# Patient Record
Sex: FEMALE | Race: WHITE | Employment: PART TIME | URBAN - METROPOLITAN AREA
[De-identification: names, ages, dates, MRNs, and addresses within clinical notes are randomized per-mention and may not be internally consistent; named-entity substitution may affect disease eponyms.]

---

## 2017-01-25 RX ORDER — PANTOPRAZOLE SODIUM 40 MG/1
TABLET, DELAYED RELEASE ORAL
Qty: 90 TABLET | Refills: 3 | Status: SHIPPED | OUTPATIENT
Start: 2017-01-25 | End: 2018-03-05 | Stop reason: SDUPTHER

## 2017-01-25 RX ORDER — LISINOPRIL AND HYDROCHLOROTHIAZIDE 20; 12.5 MG/1; MG/1
TABLET ORAL
Qty: 90 TABLET | Refills: 3 | Status: SHIPPED | OUTPATIENT
Start: 2017-01-25 | End: 2018-03-05 | Stop reason: SDUPTHER

## 2017-01-25 RX ORDER — SIMVASTATIN 20 MG
20 TABLET ORAL EVERY EVENING
Qty: 90 TABLET | Refills: 3 | Status: SHIPPED | OUTPATIENT
Start: 2017-01-25 | End: 2018-03-05 | Stop reason: SDUPTHER

## 2017-01-25 RX ORDER — ATENOLOL 25 MG/1
25 TABLET ORAL DAILY
Qty: 90 TABLET | Refills: 3 | Status: ON HOLD | OUTPATIENT
Start: 2017-01-25 | End: 2018-09-20 | Stop reason: DRUGHIGH

## 2017-05-17 ENCOUNTER — OFFICE VISIT (OUTPATIENT)
Dept: PRIMARY CARE CLINIC | Age: 52
End: 2017-05-17

## 2017-05-17 VITALS
SYSTOLIC BLOOD PRESSURE: 126 MMHG | RESPIRATION RATE: 14 BRPM | HEIGHT: 61 IN | BODY MASS INDEX: 23.6 KG/M2 | TEMPERATURE: 98.1 F | DIASTOLIC BLOOD PRESSURE: 76 MMHG | HEART RATE: 72 BPM | WEIGHT: 125 LBS

## 2017-05-17 DIAGNOSIS — R06.2 WHEEZING: ICD-10-CM

## 2017-05-17 DIAGNOSIS — Z72.0 TOBACCO ABUSE: ICD-10-CM

## 2017-05-17 DIAGNOSIS — S22.43XA CLOSED FRACTURE OF MULTIPLE RIBS OF BOTH SIDES, INITIAL ENCOUNTER: ICD-10-CM

## 2017-05-17 DIAGNOSIS — Z12.31 ENCOUNTER FOR SCREENING MAMMOGRAM FOR BREAST CANCER: ICD-10-CM

## 2017-05-17 DIAGNOSIS — J44.9 CHRONIC OBSTRUCTIVE PULMONARY DISEASE, UNSPECIFIED COPD TYPE (HCC): Primary | ICD-10-CM

## 2017-05-17 PROCEDURE — G8420 CALC BMI NORM PARAMETERS: HCPCS | Performed by: FAMILY MEDICINE

## 2017-05-17 PROCEDURE — G8926 SPIRO NO PERF OR DOC: HCPCS | Performed by: FAMILY MEDICINE

## 2017-05-17 PROCEDURE — G8427 DOCREV CUR MEDS BY ELIG CLIN: HCPCS | Performed by: FAMILY MEDICINE

## 2017-05-17 PROCEDURE — 99214 OFFICE O/P EST MOD 30 MIN: CPT | Performed by: FAMILY MEDICINE

## 2017-05-17 PROCEDURE — 3023F SPIROM DOC REV: CPT | Performed by: FAMILY MEDICINE

## 2017-05-17 PROCEDURE — 3017F COLORECTAL CA SCREEN DOC REV: CPT | Performed by: FAMILY MEDICINE

## 2017-05-17 PROCEDURE — 3014F SCREEN MAMMO DOC REV: CPT | Performed by: FAMILY MEDICINE

## 2017-05-17 PROCEDURE — 4004F PT TOBACCO SCREEN RCVD TLK: CPT | Performed by: FAMILY MEDICINE

## 2017-05-17 ASSESSMENT — PATIENT HEALTH QUESTIONNAIRE - PHQ9
SUM OF ALL RESPONSES TO PHQ QUESTIONS 1-9: 0
1. LITTLE INTEREST OR PLEASURE IN DOING THINGS: 0
SUM OF ALL RESPONSES TO PHQ9 QUESTIONS 1 & 2: 0
2. FEELING DOWN, DEPRESSED OR HOPELESS: 0

## 2017-05-17 ASSESSMENT — ENCOUNTER SYMPTOMS
EYES NEGATIVE: 1
WHEEZING: 1
ABDOMINAL PAIN: 0
ALLERGIC/IMMUNOLOGIC NEGATIVE: 1

## 2017-08-17 ENCOUNTER — OFFICE VISIT (OUTPATIENT)
Dept: PULMONOLOGY | Age: 52
End: 2017-08-17

## 2017-08-17 VITALS
HEART RATE: 70 BPM | WEIGHT: 123 LBS | RESPIRATION RATE: 16 BRPM | DIASTOLIC BLOOD PRESSURE: 64 MMHG | SYSTOLIC BLOOD PRESSURE: 118 MMHG | TEMPERATURE: 98 F | BODY MASS INDEX: 23.22 KG/M2 | OXYGEN SATURATION: 96 % | HEIGHT: 61 IN

## 2017-08-17 DIAGNOSIS — G47.33 OBSTRUCTIVE SLEEP APNEA: ICD-10-CM

## 2017-08-17 DIAGNOSIS — R91.1 LUNG NODULE: Primary | ICD-10-CM

## 2017-08-17 DIAGNOSIS — J44.9 CHRONIC OBSTRUCTIVE PULMONARY DISEASE, UNSPECIFIED COPD TYPE (HCC): ICD-10-CM

## 2017-08-17 PROCEDURE — 3023F SPIROM DOC REV: CPT | Performed by: INTERNAL MEDICINE

## 2017-08-17 PROCEDURE — G8926 SPIRO NO PERF OR DOC: HCPCS | Performed by: INTERNAL MEDICINE

## 2017-08-17 PROCEDURE — 3017F COLORECTAL CA SCREEN DOC REV: CPT | Performed by: INTERNAL MEDICINE

## 2017-08-17 PROCEDURE — G8420 CALC BMI NORM PARAMETERS: HCPCS | Performed by: INTERNAL MEDICINE

## 2017-08-17 PROCEDURE — 4004F PT TOBACCO SCREEN RCVD TLK: CPT | Performed by: INTERNAL MEDICINE

## 2017-08-17 PROCEDURE — 99204 OFFICE O/P NEW MOD 45 MIN: CPT | Performed by: INTERNAL MEDICINE

## 2017-08-17 PROCEDURE — G8427 DOCREV CUR MEDS BY ELIG CLIN: HCPCS | Performed by: INTERNAL MEDICINE

## 2017-08-17 PROCEDURE — 3014F SCREEN MAMMO DOC REV: CPT | Performed by: INTERNAL MEDICINE

## 2017-08-17 ASSESSMENT — ENCOUNTER SYMPTOMS
WHEEZING: 1
VOMITING: 0
SINUS PRESSURE: 0
SORE THROAT: 0
COUGH: 1
ABDOMINAL PAIN: 0
NAUSEA: 0
SHORTNESS OF BREATH: 1
RHINORRHEA: 0
CHEST TIGHTNESS: 1
DIARRHEA: 0

## 2017-09-05 ENCOUNTER — HOSPITAL ENCOUNTER (OUTPATIENT)
Dept: PULMONOLOGY | Age: 52
Discharge: HOME | End: 2017-09-05
Payer: COMMERCIAL

## 2017-09-05 DIAGNOSIS — J44.9 CHRONIC OBSTRUCTIVE PULMONARY DISEASE, UNSPECIFIED COPD TYPE (HCC): ICD-10-CM

## 2017-09-05 PROCEDURE — 94726 PLETHYSMOGRAPHY LUNG VOLUMES: CPT | Performed by: INTERNAL MEDICINE

## 2017-09-05 PROCEDURE — 6360000002 HC RX W HCPCS: Performed by: INTERNAL MEDICINE

## 2017-09-05 PROCEDURE — 94729 DIFFUSING CAPACITY: CPT

## 2017-09-05 PROCEDURE — 94729 DIFFUSING CAPACITY: CPT | Performed by: INTERNAL MEDICINE

## 2017-09-05 PROCEDURE — 94060 EVALUATION OF WHEEZING: CPT

## 2017-09-05 PROCEDURE — 94726 PLETHYSMOGRAPHY LUNG VOLUMES: CPT

## 2017-09-05 PROCEDURE — 94060 EVALUATION OF WHEEZING: CPT | Performed by: INTERNAL MEDICINE

## 2017-09-05 RX ORDER — ALBUTEROL SULFATE 2.5 MG/3ML
2.5 SOLUTION RESPIRATORY (INHALATION) ONCE
Status: COMPLETED | OUTPATIENT
Start: 2017-09-05 | End: 2017-09-05

## 2017-09-05 RX ADMIN — ALBUTEROL SULFATE 2.5 MG: 2.5 SOLUTION RESPIRATORY (INHALATION) at 15:41

## 2017-10-04 ENCOUNTER — OFFICE VISIT (OUTPATIENT)
Dept: PULMONOLOGY | Age: 52
End: 2017-10-04

## 2017-10-04 VITALS
TEMPERATURE: 98.1 F | RESPIRATION RATE: 16 BRPM | WEIGHT: 125 LBS | BODY MASS INDEX: 23.6 KG/M2 | DIASTOLIC BLOOD PRESSURE: 80 MMHG | HEART RATE: 65 BPM | SYSTOLIC BLOOD PRESSURE: 126 MMHG | OXYGEN SATURATION: 98 % | HEIGHT: 61 IN

## 2017-10-04 DIAGNOSIS — R91.1 LUNG NODULE: Primary | ICD-10-CM

## 2017-10-04 DIAGNOSIS — J44.9 CHRONIC OBSTRUCTIVE PULMONARY DISEASE, UNSPECIFIED COPD TYPE (HCC): ICD-10-CM

## 2017-10-04 DIAGNOSIS — G47.33 OBSTRUCTIVE SLEEP APNEA: ICD-10-CM

## 2017-10-04 PROCEDURE — G8926 SPIRO NO PERF OR DOC: HCPCS | Performed by: INTERNAL MEDICINE

## 2017-10-04 PROCEDURE — 4004F PT TOBACCO SCREEN RCVD TLK: CPT | Performed by: INTERNAL MEDICINE

## 2017-10-04 PROCEDURE — 99214 OFFICE O/P EST MOD 30 MIN: CPT | Performed by: INTERNAL MEDICINE

## 2017-10-04 PROCEDURE — G8484 FLU IMMUNIZE NO ADMIN: HCPCS | Performed by: INTERNAL MEDICINE

## 2017-10-04 PROCEDURE — G8427 DOCREV CUR MEDS BY ELIG CLIN: HCPCS | Performed by: INTERNAL MEDICINE

## 2017-10-04 PROCEDURE — G8420 CALC BMI NORM PARAMETERS: HCPCS | Performed by: INTERNAL MEDICINE

## 2017-10-04 PROCEDURE — 3023F SPIROM DOC REV: CPT | Performed by: INTERNAL MEDICINE

## 2017-10-04 PROCEDURE — 3017F COLORECTAL CA SCREEN DOC REV: CPT | Performed by: INTERNAL MEDICINE

## 2017-10-04 PROCEDURE — 3014F SCREEN MAMMO DOC REV: CPT | Performed by: INTERNAL MEDICINE

## 2017-10-04 RX ORDER — VARENICLINE TARTRATE 1 MG/1
1 TABLET, FILM COATED ORAL 2 TIMES DAILY
Qty: 60 TABLET | Refills: 3 | Status: SHIPPED | OUTPATIENT
Start: 2017-10-04 | End: 2018-02-08

## 2017-10-04 RX ORDER — VARENICLINE TARTRATE 25 MG
KIT ORAL
Qty: 1 EACH | Refills: 0 | Status: SHIPPED | OUTPATIENT
Start: 2017-10-04 | End: 2017-10-04 | Stop reason: SDUPTHER

## 2017-10-04 RX ORDER — VARENICLINE TARTRATE 1 MG/1
1 TABLET, FILM COATED ORAL 2 TIMES DAILY
Qty: 60 TABLET | Refills: 3 | Status: SHIPPED | OUTPATIENT
Start: 2017-10-04 | End: 2017-10-04 | Stop reason: SDUPTHER

## 2017-10-04 RX ORDER — VARENICLINE TARTRATE 25 MG
KIT ORAL
Qty: 1 EACH | Refills: 0 | Status: SHIPPED | OUTPATIENT
Start: 2017-10-04 | End: 2018-02-08

## 2017-10-04 ASSESSMENT — ENCOUNTER SYMPTOMS
COUGH: 1
ABDOMINAL PAIN: 0
RHINORRHEA: 0
SINUS PRESSURE: 0
VOMITING: 0
SHORTNESS OF BREATH: 1
WHEEZING: 1
SORE THROAT: 0
CHEST TIGHTNESS: 1
NAUSEA: 0
DIARRHEA: 0

## 2017-10-04 NOTE — PROGRESS NOTES
Subjective:     Sneha Colin is a 46 y.o. female who complains today of:     Chief Complaint   Patient presents with    COPD    Sleep Apnea    Other     lung nodule       HPI  Patient is here for a follow-up visit regarding COPD and lung nodules as well as suspected sleep apnea. Since initial visit patient had complete pulmonary functions done which showed mild to moderate obstructive disease somewhat worse than her spirometry done 3 years ago. She also had nocturnal oximetry testing which showed significant desaturations periodically they appeared to be cyclical consistent with obstructive sleep apnea. Other times she had persistent desaturations consistent with alveolar hypoventilation due to COPD. Patient reports continued cough sputum production wheezing and shortness breath on exertion. She understands the effect of smoking and the proven worsening of her obstructive lung disease and is interested in trying Chantix to help her quit smoking now. Allergies:  Latex; Demerol; and Pcn [penicillins]  Past Medical History:   Diagnosis Date    Abdominal pain, right upper quadrant, neg US, [place] Rachel 10/27/2014    Chronic obstructive pulmonary disease ([address] 4/5/2016    Diabetes mellitus ([address]     Dyslipidemia 7/25/2013    Esophageal stricture 7/25/2013    Fatty liver 10/27/2014    GERD (gastroesophageal reflux disease)     Gestational diabetes mellitus     Hiatal hernia 12/8/2014    HNP (herniated nucleus pulposus) 04/2006    C5-6 C6-7  (L)    Hyperglycemia 5/1/2013    Hyperlipidemia     IBS (irritable bowel syndrome) 10/27/2014    OA (osteoarthritis), R shoulder 10/28/2014    Oxygen dependent, and smoking 9/12/2016    Systemic lupus ([facility] [address]     BY GYN    Thrush 10/10/2016    Tobacco abuse     Wheezing 4/8/2015     Past Surgical History:   Procedure Laterality Date    APPENDECTOMY  79,80    BLADDER SURGERY  2000    FISCH6 X2    CARDIAC CATHETERIZATION  3/18/2014    DR. Lebron Adjutant   SECTION  90,11,14     X3    COLONOSCOPY  2015    DILATION AND CURETTAGE OF UTERUS      MANY D&C 'S    HERNIA REPAIR      HYSTERECTOMY  2000    UPPER GASTROINTESTINAL ENDOSCOPY  13    DR. MCKENZIE     Family History   Problem Relation Age of Onset    Cancer Mother      PANCREAS    High Blood Pressure Mother     Cancer Father      Skip Mcleod     Social History     Social History    Marital status:      Spouse name: N/A    Number of children: N/A    Years of education: N/A     Occupational History    Not on file. Social History Main Topics    Smoking status: Current Every Day Smoker     Packs/day: 0.70     Years: 30.00     Types: Cigarettes    Smokeless tobacco: Never Used    Alcohol use Yes      Comment: occ    Drug use: No    Sexual activity: Not on file     Other Topics Concern    Not on file     Social History Narrative         Review of Systems   Constitutional: Negative for appetite change, chills, diaphoresis, fatigue and fever. HENT: Negative for congestion, nosebleeds, postnasal drip, rhinorrhea, sinus pressure, sneezing and sore throat. Eyes: Negative for visual disturbance. Respiratory: Positive for cough, chest tightness, shortness of breath and wheezing. Cardiovascular: Positive for palpitations. Negative for chest pain and leg swelling. Gastrointestinal: Negative for abdominal pain, diarrhea, nausea and vomiting. Genitourinary: Negative for dysuria and urgency. Musculoskeletal: Negative for arthralgias, joint swelling and myalgias. Skin: Negative for rash. Allergic/Immunologic: Negative for environmental allergies. Neurological: Negative for tremors, weakness, light-headedness and headaches. Psychiatric/Behavioral: Negative for behavioral problems and sleep disturbance.          Objective:     Vitals:    10/04/17 1049   BP: 126/80   Site: Right Arm   Position: Sitting   Cuff Size: Medium Adult   Pulse: 65   Resp: 16   Temp: 98.1 °F (36.7 °C)   TempSrc: Tympanic   SpO2: 98%   Weight: 125 lb (56.7 kg)   Height: 5' 1\" (1.549 m)         Physical Exam   Constitutional: She is oriented to person, place, and time. She appears well-developed and well-nourished. HENT:   Head: Normocephalic and atraumatic. Mouth/Throat: Oropharynx is clear and moist.   Eyes: EOM are normal. Pupils are equal, round, and reactive to light. Neck: No JVD present. No tracheal deviation present. No thyromegaly present. Cardiovascular: Normal rate and regular rhythm. Exam reveals no gallop. No murmur heard. Pulmonary/Chest: She has no wheezes. She has no rales. She exhibits no tenderness. Abdominal: She exhibits no distension. Musculoskeletal: Normal range of motion. She exhibits no edema. Neurological: She is alert and oriented to person, place, and time. Coordination normal.   Skin: Skin is warm and dry. No rash noted. Psychiatric: She has a normal mood and affect. Assessment:     1. Lung nodule  XR CHEST STANDARD (2 VW)   2. Chronic obstructive pulmonary disease, unspecified COPD type ([address]     3. Obstructive sleep apnea       Patient states that she is not able to afford initiating oxygen therapy she had already discussed that with the home care company and her deductibles will be too high to start now she was encouraged to work aggressively on quitting smoking I will prescribe Chantix for her and we'll obtain a follow-up chest x-ray before next visit then I'll see her for follow-up in 3 months      Plan:     Orders Placed This Encounter   Procedures    XR CHEST STANDARD (2 VW)     Standing Status:   Future     Standing Expiration Date:   1/19/2018     Order Specific Question:   Reason for exam:     Answer:   Shortness of breath and calcified lung nodules     Orders Placed This Encounter   Medications    varenicline (CHANTIX STARTING MONTH PAK) 0.5 MG X 11 & 1 MG X 42 tablet     Sig: Take by mouth.      Dispense:  1 each     Refill:  0    varenicline (CHANTIX CONTINUING MONTH BEST) 1 MG tablet     Sig: Take 1 tablet by mouth 2 times daily     Dispense:  60 tablet     Refill:  3           Return in about 3 months (around 1/4/2018) for re-evaluation.       Severa Cam, MD

## 2017-10-04 NOTE — MR AVS SNAPSHOT
Urine Check For Kidney Problems 3/30/1983    Tetanus Combination Vaccine (1 - Tdap) 3/30/1984    Pneumococcal Vaccine - Pneumovax for adults aged 19-64 years with: chronic heart disease, chronic lung disease, diabetes mellitus, alcoholism, chronic liver disease, or cigarette smoking. (1 of 1 - PPSV23) 3/30/1984    Hemoglobin A1C (Test For Long-Term Glucose Control) 5/1/2014    Eye Exam By An Eye Doctor 4/7/2015    Mammograms are recommended every 2 years for low/average risk patients aged 48 - 69, and every year for high risk patients per updated national guidelines. However these guidelines can be individualized by your provider. 12/29/2016    Cholesterol Screening 3/24/2017    Yearly Flu Vaccine (1) 9/1/2017    Pap Smear 1/29/2018    Colonoscopy 4/1/2025            [employer] Signup           Our records indicate that you have an active [employer] account. You can view your After Visit Summary by going to [email or web address] and logging in with your [employer]IO username and password. If you don't have a [employer] username and password but a parent or guardian has access to your record, the parent or guardian should login with their own [employer] username and password and access your record to view the After Visit Summary. Additional Information  If you have questions, please contact the physician practice where you receive care. Remember, [employer] is NOT to be used for urgent needs. For medical emergencies, dial 911. For questions regarding your [employer] account call [phone]. If you have a clinical question, please call your doctor's office.

## 2017-10-05 ENCOUNTER — NURSE ONLY (OUTPATIENT)
Dept: PRIMARY CARE CLINIC | Age: 52
End: 2017-10-05

## 2017-10-05 DIAGNOSIS — R73.9 HYPERGLYCEMIA: Primary | ICD-10-CM

## 2017-10-05 DIAGNOSIS — R73.9 HYPERGLYCEMIA: ICD-10-CM

## 2017-10-05 LAB
CREATININE URINE: 17.1 MG/DL
HBA1C MFR BLD: 5.5 %
MICROALBUMIN UR-MCNC: <1.2 MG/DL
MICROALBUMIN/CREAT UR-RTO: NORMAL MG/G (ref 0–30)

## 2017-10-05 PROCEDURE — 83036 HEMOGLOBIN GLYCOSYLATED A1C: CPT | Performed by: FAMILY MEDICINE

## 2017-11-27 RX ORDER — GLYCOPYRROLATE AND FORMOTEROL FUMARATE 9; 4.8 UG/1; UG/1
AEROSOL, METERED RESPIRATORY (INHALATION)
Qty: 10.7 G | Refills: 2 | OUTPATIENT
Start: 2017-11-27

## 2018-02-02 RX ORDER — SIMVASTATIN 20 MG
TABLET ORAL
Qty: 30 TABLET | Refills: 11 | OUTPATIENT
Start: 2018-02-02

## 2018-02-02 RX ORDER — PANTOPRAZOLE SODIUM 40 MG/1
TABLET, DELAYED RELEASE ORAL
Qty: 30 TABLET | Refills: 11 | OUTPATIENT
Start: 2018-02-02

## 2018-02-02 RX ORDER — LISINOPRIL AND HYDROCHLOROTHIAZIDE 20; 12.5 MG/1; MG/1
TABLET ORAL
Qty: 30 TABLET | Refills: 11 | OUTPATIENT
Start: 2018-02-02

## 2018-02-08 ENCOUNTER — OFFICE VISIT (OUTPATIENT)
Dept: PRIMARY CARE CLINIC | Age: 53
End: 2018-02-08
Payer: COMMERCIAL

## 2018-02-08 ENCOUNTER — TELEPHONE (OUTPATIENT)
Dept: PRIMARY CARE CLINIC | Age: 53
End: 2018-02-08

## 2018-02-08 VITALS
SYSTOLIC BLOOD PRESSURE: 138 MMHG | DIASTOLIC BLOOD PRESSURE: 80 MMHG | HEART RATE: 68 BPM | TEMPERATURE: 98.2 F | HEIGHT: 61 IN | RESPIRATION RATE: 14 BRPM | WEIGHT: 129 LBS | BODY MASS INDEX: 24.35 KG/M2

## 2018-02-08 DIAGNOSIS — R73.9 HYPERGLYCEMIA: ICD-10-CM

## 2018-02-08 DIAGNOSIS — E11.8 TYPE 2 DIABETES MELLITUS WITH COMPLICATION, WITHOUT LONG-TERM CURRENT USE OF INSULIN (HCC): Primary | ICD-10-CM

## 2018-02-08 DIAGNOSIS — M19.031 OSTEOARTHRITIS OF RIGHT WRIST, UNSPECIFIED OSTEOARTHRITIS TYPE: ICD-10-CM

## 2018-02-08 DIAGNOSIS — Z72.0 TOBACCO ABUSE: ICD-10-CM

## 2018-02-08 DIAGNOSIS — E78.5 DYSLIPIDEMIA: ICD-10-CM

## 2018-02-08 DIAGNOSIS — I10 ESSENTIAL HYPERTENSION: ICD-10-CM

## 2018-02-08 DIAGNOSIS — J44.9 CHRONIC OBSTRUCTIVE PULMONARY DISEASE, UNSPECIFIED COPD TYPE (HCC): ICD-10-CM

## 2018-02-08 LAB — HBA1C MFR BLD: 5.4 %

## 2018-02-08 PROCEDURE — G8420 CALC BMI NORM PARAMETERS: HCPCS | Performed by: FAMILY MEDICINE

## 2018-02-08 PROCEDURE — 83036 HEMOGLOBIN GLYCOSYLATED A1C: CPT | Performed by: FAMILY MEDICINE

## 2018-02-08 PROCEDURE — 3014F SCREEN MAMMO DOC REV: CPT | Performed by: FAMILY MEDICINE

## 2018-02-08 PROCEDURE — 3044F HG A1C LEVEL LT 7.0%: CPT | Performed by: FAMILY MEDICINE

## 2018-02-08 PROCEDURE — G8427 DOCREV CUR MEDS BY ELIG CLIN: HCPCS | Performed by: FAMILY MEDICINE

## 2018-02-08 PROCEDURE — 4004F PT TOBACCO SCREEN RCVD TLK: CPT | Performed by: FAMILY MEDICINE

## 2018-02-08 PROCEDURE — 3017F COLORECTAL CA SCREEN DOC REV: CPT | Performed by: FAMILY MEDICINE

## 2018-02-08 PROCEDURE — 3023F SPIROM DOC REV: CPT | Performed by: FAMILY MEDICINE

## 2018-02-08 PROCEDURE — G8484 FLU IMMUNIZE NO ADMIN: HCPCS | Performed by: FAMILY MEDICINE

## 2018-02-08 PROCEDURE — G8926 SPIRO NO PERF OR DOC: HCPCS | Performed by: FAMILY MEDICINE

## 2018-02-08 PROCEDURE — 99214 OFFICE O/P EST MOD 30 MIN: CPT | Performed by: FAMILY MEDICINE

## 2018-02-08 ASSESSMENT — ENCOUNTER SYMPTOMS
EYE PAIN: 0
BLURRED VISION: 0
WHEEZING: 0
APNEA: 0
CHEST TIGHTNESS: 0
NAUSEA: 0
STRIDOR: 0
EYE DISCHARGE: 0
ORTHOPNEA: 0
SHORTNESS OF BREATH: 0
CHOKING: 0
EYE REDNESS: 0
COLOR CHANGE: 0
DIARRHEA: 0
CONSTIPATION: 0
ABDOMINAL PAIN: 0
FACIAL SWELLING: 0
PHOTOPHOBIA: 0

## 2018-02-08 NOTE — PROGRESS NOTES
exposure, hypertension and dyslipidemia. When asked about current treatments, none were reported. Shoulder Pain    The pain is present in the left shoulder and right shoulder. This is a recurrent problem. The current episode started more than 1 month ago. There has been a history of trauma. The problem occurs constantly. The problem has been unchanged. The pain is moderate. Associated symptoms include a limited range of motion. Pertinent negatives include no fever, inability to bear weight, itching, joint locking, joint swelling, numbness, stiffness or tingling. The symptoms are aggravated by activity and lying down. Past Medical History:   Diagnosis Date    Abdominal pain, right upper quadrant, neg US, [place] Rachel 10/27/2014    Chronic obstructive pulmonary disease ([address] 2016    Diabetes mellitus ([address]     Dyslipidemia 2013    Esophageal stricture 2013    Fatty liver 10/27/2014    GERD (gastroesophageal reflux disease)     Gestational diabetes mellitus     Hiatal hernia 2014    HNP (herniated nucleus pulposus) 2006    C5-6 C6-7  (L)    Hyperglycemia 2013    Hyperlipidemia     IBS (irritable bowel syndrome) 10/27/2014    OA (osteoarthritis), R shoulder 10/28/2014    Oxygen dependent, and smoking 2016    Systemic lupus ([facility] [address]     BY GYN    Thrush 10/10/2016    Tobacco abuse     Wheezing 2015     Past Surgical History:   Procedure Laterality Date    APPENDECTOMY  79,80    BLADDER SURGERY      FISCH6 X2    CARDIAC CATHETERIZATION  3/18/2014    DR. Sreedhar Hurst  SECTION  97,47,24     X3    COLONOSCOPY  2015    DILATION AND CURETTAGE OF UTERUS      MANY D&C 'S    HERNIA REPAIR      HYSTERECTOMY      UPPER GASTROINTESTINAL ENDOSCOPY  13    DR. MCKENZIE     Family History   Problem Relation Age of Onset    Cancer Mother      PANCREAS    High Blood Pressure Mother     Cancer Father      Juan Comfort     Social History

## 2018-02-13 LAB
A/G RATIO: 1.4 (ref 0.9–2.4)
ALBUMIN: 4.1 G/DL (ref 3.4–5)
ALP BLD-CCNC: 87 U/L (ref 45–117)
ALT SERPL-CCNC: 20 U/L (ref 7–45)
ANION GAP SERPL CALCULATED.3IONS-SCNC: 10 MMOL/L (ref 10–20)
AST SERPL-CCNC: 21 U/L (ref 13–39)
BICARBONATE: 31 MMOL/L (ref 21–32)
BILIRUB SERPL-MCNC: 0.3 MG/DL (ref 0–1.2)
BUN / CREAT RATIO: 15 (ref 5–25)
CALCIUM SERPL-MCNC: 9.5 MG/DL (ref 8.6–10.3)
CHLORIDE BLD-SCNC: 103 MMOL/L (ref 98–107)
CHOLESTEROL/HDL RATIO: 7.1
CHOLESTEROL: 235 MG/DL
CREAT SERPL-MCNC: 0.72 MG/DL (ref 0.5–1.05)
GFR CALCULATED: >60
GLUCOSE: 99 MG/DL (ref 70–100)
HDLC SERPL-MCNC: 33 MG/DL
LDL CHOLESTEROL: 94 MG/DL
POTASSIUM SERPL-SCNC: 3.9 MMOL/L (ref 3.5–5.1)
SODIUM BLD-SCNC: 140 MMOL/L (ref 136–145)
TOTAL PROTEIN: 7.1 G/DL (ref 6.4–8.2)
TRIGL SERPL-MCNC: 620 MG/DL
UREA NITROGEN: 11 MG/DL (ref 6–23)
VLDLC SERPL CALC-MCNC: ABNORMAL MG/DL

## 2018-03-05 RX ORDER — PANTOPRAZOLE SODIUM 40 MG/1
TABLET, DELAYED RELEASE ORAL
Qty: 90 TABLET | Refills: 1 | Status: SHIPPED | OUTPATIENT
Start: 2018-03-05 | End: 2018-08-31 | Stop reason: SDUPTHER

## 2018-03-05 RX ORDER — SIMVASTATIN 20 MG
TABLET ORAL
Qty: 90 TABLET | Refills: 1 | Status: SHIPPED | OUTPATIENT
Start: 2018-03-05 | End: 2018-08-13 | Stop reason: SDUPTHER

## 2018-03-05 RX ORDER — LISINOPRIL AND HYDROCHLOROTHIAZIDE 20; 12.5 MG/1; MG/1
TABLET ORAL
Qty: 90 TABLET | Refills: 1 | Status: SHIPPED | OUTPATIENT
Start: 2018-03-05 | End: 2018-08-31 | Stop reason: SDUPTHER

## 2018-06-19 ENCOUNTER — OFFICE VISIT (OUTPATIENT)
Dept: PRIMARY CARE CLINIC | Age: 53
End: 2018-06-19
Payer: COMMERCIAL

## 2018-06-19 VITALS
BODY MASS INDEX: 23.64 KG/M2 | SYSTOLIC BLOOD PRESSURE: 120 MMHG | WEIGHT: 125.2 LBS | DIASTOLIC BLOOD PRESSURE: 100 MMHG | OXYGEN SATURATION: 96 % | HEIGHT: 61 IN | TEMPERATURE: 97.7 F | HEART RATE: 63 BPM

## 2018-06-19 DIAGNOSIS — R42 DIZZINESS: ICD-10-CM

## 2018-06-19 DIAGNOSIS — H69.83 DYSFUNCTION OF BOTH EUSTACHIAN TUBES: ICD-10-CM

## 2018-06-19 DIAGNOSIS — M94.0 COSTOCHONDRITIS: Primary | ICD-10-CM

## 2018-06-19 DIAGNOSIS — J44.9 CHRONIC OBSTRUCTIVE PULMONARY DISEASE, UNSPECIFIED COPD TYPE (HCC): ICD-10-CM

## 2018-06-19 DIAGNOSIS — Z12.39 BREAST CANCER SCREENING: ICD-10-CM

## 2018-06-19 DIAGNOSIS — R07.9 CHEST PAIN IN ADULT: ICD-10-CM

## 2018-06-19 PROCEDURE — 3023F SPIROM DOC REV: CPT | Performed by: FAMILY MEDICINE

## 2018-06-19 PROCEDURE — G8420 CALC BMI NORM PARAMETERS: HCPCS | Performed by: FAMILY MEDICINE

## 2018-06-19 PROCEDURE — 3017F COLORECTAL CA SCREEN DOC REV: CPT | Performed by: FAMILY MEDICINE

## 2018-06-19 PROCEDURE — 99214 OFFICE O/P EST MOD 30 MIN: CPT | Performed by: FAMILY MEDICINE

## 2018-06-19 PROCEDURE — 93000 ELECTROCARDIOGRAM COMPLETE: CPT | Performed by: FAMILY MEDICINE

## 2018-06-19 PROCEDURE — G8427 DOCREV CUR MEDS BY ELIG CLIN: HCPCS | Performed by: FAMILY MEDICINE

## 2018-06-19 PROCEDURE — G8926 SPIRO NO PERF OR DOC: HCPCS | Performed by: FAMILY MEDICINE

## 2018-06-19 PROCEDURE — 4004F PT TOBACCO SCREEN RCVD TLK: CPT | Performed by: FAMILY MEDICINE

## 2018-06-19 RX ORDER — MECLIZINE HYDROCHLORIDE 25 MG/1
25 TABLET ORAL 3 TIMES DAILY PRN
Qty: 30 TABLET | Refills: 1 | Status: SHIPPED | OUTPATIENT
Start: 2018-06-19 | End: 2018-06-29

## 2018-06-19 RX ORDER — PREDNISONE 10 MG/1
TABLET ORAL
Qty: 20 TABLET | Refills: 0 | Status: SHIPPED | OUTPATIENT
Start: 2018-06-19 | End: 2018-06-29

## 2018-06-19 RX ORDER — AZELASTINE 1 MG/ML
2 SPRAY, METERED NASAL 2 TIMES DAILY
Qty: 1 BOTTLE | Refills: 3 | Status: SHIPPED | OUTPATIENT
Start: 2018-06-19 | End: 2018-07-23 | Stop reason: ALTCHOICE

## 2018-06-19 ASSESSMENT — ENCOUNTER SYMPTOMS
COUGH: 0
COLOR CHANGE: 0
CHEST TIGHTNESS: 0
CHOKING: 0
FACIAL SWELLING: 0
DIARRHEA: 0
ORTHOPNEA: 0
APNEA: 0
ABDOMINAL PAIN: 0
SPUTUM PRODUCTION: 0
WHEEZING: 0
STRIDOR: 0
PHOTOPHOBIA: 0
SHORTNESS OF BREATH: 1
VOMITING: 0
NAUSEA: 0
HEMOPTYSIS: 0
EYE REDNESS: 0
EYE DISCHARGE: 0
BACK PAIN: 0
EYE PAIN: 0
CONSTIPATION: 0

## 2018-07-05 ENCOUNTER — HOSPITAL ENCOUNTER (OUTPATIENT)
Dept: NON INVASIVE DIAGNOSTICS | Age: 53
Discharge: HOME | End: 2018-07-05
Payer: COMMERCIAL

## 2018-07-05 DIAGNOSIS — R07.9 CHEST PAIN IN ADULT: ICD-10-CM

## 2018-07-05 DIAGNOSIS — R42 DIZZINESS: ICD-10-CM

## 2018-07-05 PROCEDURE — 93017 CV STRESS TEST TRACING ONLY: CPT

## 2018-07-05 NOTE — PROGRESS NOTES
Hx,allergies and medications reviewed. Procedure explained and informed consent obtained. Tolerated treadmill well for 4:30 minutes. SOB reported and muscular pain on left when patient presses on area. Returned to baseline in recovery. Reached 88% target HR. O2 sat at end of exam 95% on RA. EKG showed run of bigemeny and trigemeny.

## 2018-07-05 NOTE — PROCEDURES
advised.     Mathew Bradnt DO    D: 07/05/2018 #19:06:22       T: 07/05/2018 19:21:55     [email or web address]  Job#: [id number]     Doc#: [id number]    CC:

## 2018-07-12 ENCOUNTER — HOSPITAL ENCOUNTER (OUTPATIENT)
Dept: WOMENS IMAGING | Age: 53
Discharge: HOME | End: 2018-07-14
Payer: COMMERCIAL

## 2018-07-12 DIAGNOSIS — Z12.39 BREAST CANCER SCREENING: ICD-10-CM

## 2018-07-12 PROCEDURE — 77067 SCR MAMMO BI INCL CAD: CPT

## 2018-07-19 ENCOUNTER — TELEPHONE (OUTPATIENT)
Dept: PRIMARY CARE CLINIC | Age: 53
End: 2018-07-19

## 2018-07-20 DIAGNOSIS — R94.39 ABNORMAL STRESS TEST: Primary | ICD-10-CM

## 2018-07-20 PROBLEM — E78.00 PURE HYPERCHOLESTEROLEMIA: Status: ACTIVE | Noted: 2018-07-20

## 2018-07-20 PROBLEM — I25.10 CORONARY ATHEROSCLEROSIS: Status: ACTIVE | Noted: 2018-07-20

## 2018-07-23 ENCOUNTER — OFFICE VISIT (OUTPATIENT)
Dept: CARDIOLOGY CLINIC | Age: 53
End: 2018-07-23
Payer: COMMERCIAL

## 2018-07-23 VITALS
HEIGHT: 61 IN | RESPIRATION RATE: 20 BRPM | WEIGHT: 125.6 LBS | BODY MASS INDEX: 23.71 KG/M2 | DIASTOLIC BLOOD PRESSURE: 82 MMHG | TEMPERATURE: 97.3 F | OXYGEN SATURATION: 97 % | SYSTOLIC BLOOD PRESSURE: 124 MMHG | HEART RATE: 80 BPM

## 2018-07-23 DIAGNOSIS — I25.83 CORONARY ATHEROSCLEROSIS DUE TO LIPID RICH PLAQUE: ICD-10-CM

## 2018-07-23 DIAGNOSIS — F17.200 SMOKER: ICD-10-CM

## 2018-07-23 DIAGNOSIS — I10 HYPERTENSION, UNSPECIFIED TYPE: ICD-10-CM

## 2018-07-23 DIAGNOSIS — R94.39 ABNORMAL CARDIOVASCULAR STRESS TEST: Primary | ICD-10-CM

## 2018-07-23 DIAGNOSIS — I25.10 CORONARY ATHEROSCLEROSIS DUE TO LIPID RICH PLAQUE: ICD-10-CM

## 2018-07-23 PROCEDURE — G8420 CALC BMI NORM PARAMETERS: HCPCS | Performed by: INTERNAL MEDICINE

## 2018-07-23 PROCEDURE — 99244 OFF/OP CNSLTJ NEW/EST MOD 40: CPT | Performed by: INTERNAL MEDICINE

## 2018-07-23 PROCEDURE — 3017F COLORECTAL CA SCREEN DOC REV: CPT | Performed by: INTERNAL MEDICINE

## 2018-07-23 PROCEDURE — G8427 DOCREV CUR MEDS BY ELIG CLIN: HCPCS | Performed by: INTERNAL MEDICINE

## 2018-07-23 ASSESSMENT — ENCOUNTER SYMPTOMS
ABDOMINAL DISTENTION: 0
COLOR CHANGE: 0
TROUBLE SWALLOWING: 0
CHEST TIGHTNESS: 1
DIARRHEA: 0
BLOOD IN STOOL: 0
WHEEZING: 0
APNEA: 0
FACIAL SWELLING: 0
ANAL BLEEDING: 0
VOICE CHANGE: 0
NAUSEA: 0
VOMITING: 0
SHORTNESS OF BREATH: 0

## 2018-07-23 NOTE — PROGRESS NOTES
CONSULT        Patient: Markell Anaya  YOB: 1965  MRN: [id number]    Chief Complaint:  Chief Complaint   Patient presents with   [facility] Cardiologist     Referred by Dr. Jazmyne Guillory Abnormal Test Results     ABN Stress test       Subjective/HPI:   7/23/18: Patient presents today for Angina and abnormal stress test. She is 48years old. Consulted by Reta Neff. Smoker.  is retired traffic controller. Very strong family history of coronary artery disease. Had a cardiac catheterization by Dr. Deven Kong 5 years ago which revealed moderate disease and medical therapy was advised. She also has GERD has hypertension and hypertensive heart disease dyslipidemia. Has been complaining of chest discomfort in the midline of the sternum feels like pressure. A stress test was positive. Chest pressure with in the retrosternal area. No definite radiation no nausea no vomiting also feels like a lump in the throat. No sweats. Moderate intensity.  High likelihood of coronary artery disease        Past Medical History:   Diagnosis Date    Abdominal pain, right upper quadrant, neg US, [place] Rachel 10/27/2014    Abnormal cardiovascular stress test 7/20/2018    Chronic obstructive pulmonary disease ([address] 4/5/2016    Diabetes mellitus ([address]     Dyslipidemia 7/25/2013    Esophageal stricture 7/25/2013    Fatty liver 10/27/2014    GERD (gastroesophageal reflux disease)     Gestational diabetes mellitus     Hiatal hernia 12/8/2014    HNP (herniated nucleus pulposus) 04/2006    C5-6 C6-7  (L)    Hyperglycemia 5/1/2013    Hyperlipidemia     IBS (irritable bowel syndrome) 10/27/2014    OA (osteoarthritis), R shoulder 10/28/2014    Oxygen dependent, and smoking 9/12/2016    Systemic lupus ([address]     BY GYN    Thrush 10/10/2016    Tobacco abuse     Wheezing 4/8/2015       Past Surgical History:   Procedure Laterality Date    APPENDECTOMY  79,80    BLADDER SURGERY  2000    FISCH6 X2    CARDIAC CATHETERIZATION  3/18/2014    DR. Kermit Boston  SECTION  01,88,03     X3    COLONOSCOPY  2015    DILATION AND CURETTAGE OF UTERUS      MANY D&C 'S    HERNIA REPAIR      HYSTERECTOMY      UPPER GASTROINTESTINAL ENDOSCOPY  13    DR. MCKENZIE       Family History   Problem Relation Age of Onset    Cancer Mother         PANCREAS    High Blood Pressure Mother     Cancer Father         Shira Manzano       Social History     Social History    Marital status:      Spouse name: N/A    Number of children: N/A    Years of education: N/A     Social History Main Topics    Smoking status: Current Every Day Smoker     Packs/day: 0.70     Years: 30.00     Types: Cigarettes    Smokeless tobacco: Never Used    Alcohol use Yes      Comment: occ    Drug use: No    Sexual activity: Not Asked     Other Topics Concern    None     Social History Narrative    None       Allergies   Allergen Reactions    Latex Hives     Itching/ rash    Demerol Nausea And Vomiting and Other (See Comments)    Pcn [Penicillins]        Current Outpatient Prescriptions   Medication Sig Dispense Refill    lisinopril-hydrochlorothiazide (PRINZIDE;ZESTORETIC) 20-12.5 MG per tablet TAKE ONE TABLET BY MOUTH ONCE DAILY 90 tablet 1    simvastatin (ZOCOR) 20 MG tablet TAKE ONE TABLET BY MOUTH ONCE DAILY IN THE EVENING 90 tablet 1    pantoprazole (PROTONIX) 40 MG tablet TAKE ONE TABLET BY MOUTH ONCE DAILY 90 tablet 1    Glycopyrrolate-Formoterol (BEVESPI AEROSPHERE) 9-4.8 MCG/ACT AERO Inhale 2 puffs into the lungs daily 1 Inhaler 3    atenolol (TENORMIN) 25 MG tablet Take 1 tablet by mouth daily 90 tablet 3     No current facility-administered medications for this visit. Review of Systems:   Review of Systems   Constitutional: Positive for diaphoresis and fatigue. Negative for activity change, appetite change and unexpected weight change.    HENT: Negative for facial swelling, nosebleeds, trouble swallowing and voice change. Respiratory: Positive for chest tightness. Negative for apnea, shortness of breath and wheezing. Cardiovascular: Positive for chest pain. Negative for palpitations and leg swelling. Gastrointestinal: Negative for abdominal distention, anal bleeding, blood in stool, diarrhea, nausea and vomiting. Genitourinary: Negative for decreased urine volume and dysuria. Musculoskeletal: Negative for gait problem, myalgias, neck pain and neck stiffness. Skin: Negative for color change, pallor, rash and wound. Neurological: Positive for dizziness. Negative for seizures, syncope, facial asymmetry, weakness, light-headedness, numbness and headaches. Hematological: Does not bruise/bleed easily. Psychiatric/Behavioral: Negative for agitation, behavioral problems, confusion, hallucinations and suicidal ideas. The patient is not nervous/anxious. All other systems reviewed and are negative. Review of System is negative except for as mentioned above. Physical Examination:    /82 (Site: Right Arm, Position: Sitting, Cuff Size: Medium Adult)   Pulse 80   Temp 97.3 °F (36.3 °C) (Temporal)   Resp 20   Ht 5' 1\" (1.549 m)   Wt 125 lb 9.6 oz (57 kg)   LMP 02/20/2000   SpO2 97%   BMI 23.73 kg/m²    Physical Exam   Constitutional: She appears healthy. No distress. HENT:   Nose: Nose normal.   Mouth/Throat: Dentition is normal. Oropharynx is clear. Eyes: Conjunctivae are normal. Pupils are equal, round, and reactive to light. Neck: Normal range of motion and thyroid normal. Neck supple. Cardiovascular: Regular rhythm, S1 normal, S2 normal, normal heart sounds, intact distal pulses and normal pulses. PMI is not displaced. No murmur heard. Pulmonary/Chest: She has no wheezes. She has no rales. She exhibits no tenderness. Abdominal: Soft. Bowel sounds are normal. She exhibits no distension and no mass. There is no splenomegaly or hepatomegaly. There is no tenderness.  No

## 2018-07-27 LAB
BASOPHILS ABSOLUTE: NORMAL /ΜL
BASOPHILS RELATIVE PERCENT: NORMAL %
BUN BLDV-MCNC: 14 MG/DL
CALCIUM SERPL-MCNC: 9.5 MG/DL
CHLORIDE BLD-SCNC: 108 MMOL/L
CO2: 28 MMOL/L
CREAT SERPL-MCNC: 0.79 MG/DL
EOSINOPHILS ABSOLUTE: NORMAL /ΜL
EOSINOPHILS RELATIVE PERCENT: NORMAL %
GFR CALCULATED: NORMAL
GLUCOSE BLD-MCNC: 106 MG/DL
HCT VFR BLD CALC: 40.2 % (ref 36–46)
HEMOGLOBIN: 13.6 G/DL (ref 12–16)
LYMPHOCYTES ABSOLUTE: NORMAL /ΜL
LYMPHOCYTES RELATIVE PERCENT: NORMAL %
MCH RBC QN AUTO: 30.6 PG
MCHC RBC AUTO-ENTMCNC: 33.8 G/DL
MCV RBC AUTO: 90.5 FL
MONOCYTES ABSOLUTE: NORMAL /ΜL
MONOCYTES RELATIVE PERCENT: NORMAL %
NEUTROPHILS ABSOLUTE: NORMAL /ΜL
NEUTROPHILS RELATIVE PERCENT: NORMAL %
PLATELET # BLD: 314 K/ΜL
PMV BLD AUTO: 9.3 FL
POTASSIUM SERPL-SCNC: 3.6 MMOL/L
RBC # BLD: 4.44 10^6/ΜL
SODIUM BLD-SCNC: 141 MMOL/L
WBC # BLD: 7.4 10^3/ML

## 2018-08-02 ENCOUNTER — OFFICE VISIT (OUTPATIENT)
Dept: CARDIOLOGY CLINIC | Age: 53
End: 2018-08-02
Payer: COMMERCIAL

## 2018-08-02 VITALS
BODY MASS INDEX: 23.79 KG/M2 | DIASTOLIC BLOOD PRESSURE: 78 MMHG | RESPIRATION RATE: 12 BRPM | WEIGHT: 126 LBS | HEART RATE: 64 BPM | SYSTOLIC BLOOD PRESSURE: 130 MMHG | HEIGHT: 61 IN

## 2018-08-02 DIAGNOSIS — I25.10 CORONARY ATHEROSCLEROSIS DUE TO LIPID RICH PLAQUE: ICD-10-CM

## 2018-08-02 DIAGNOSIS — I70.213 ATHEROSCLEROSIS OF NATIVE ARTERIES OF EXTREMITIES WITH INTERMITTENT CLAUDICATION, BILATERAL LEGS (HCC): Primary | ICD-10-CM

## 2018-08-02 DIAGNOSIS — I25.83 CORONARY ATHEROSCLEROSIS DUE TO LIPID RICH PLAQUE: ICD-10-CM

## 2018-08-02 DIAGNOSIS — I10 HYPERTENSION, UNSPECIFIED TYPE: ICD-10-CM

## 2018-08-02 PROCEDURE — 4004F PT TOBACCO SCREEN RCVD TLK: CPT | Performed by: INTERNAL MEDICINE

## 2018-08-02 PROCEDURE — 99214 OFFICE O/P EST MOD 30 MIN: CPT | Performed by: INTERNAL MEDICINE

## 2018-08-02 PROCEDURE — G8599 NO ASA/ANTIPLAT THER USE RNG: HCPCS | Performed by: INTERNAL MEDICINE

## 2018-08-02 PROCEDURE — 3017F COLORECTAL CA SCREEN DOC REV: CPT | Performed by: INTERNAL MEDICINE

## 2018-08-02 PROCEDURE — G8427 DOCREV CUR MEDS BY ELIG CLIN: HCPCS | Performed by: INTERNAL MEDICINE

## 2018-08-02 PROCEDURE — G8420 CALC BMI NORM PARAMETERS: HCPCS | Performed by: INTERNAL MEDICINE

## 2018-08-02 ASSESSMENT — ENCOUNTER SYMPTOMS
NAUSEA: 0
COLOR CHANGE: 0
BLOOD IN STOOL: 0
ABDOMINAL DISTENTION: 0
VOMITING: 0
APNEA: 0
ABDOMINAL PAIN: 0
SHORTNESS OF BREATH: 0
COUGH: 0
ANAL BLEEDING: 0
CHEST TIGHTNESS: 0
DIARRHEA: 0

## 2018-08-02 NOTE — PROGRESS NOTES
mouth daily 90 tablet 3     No current facility-administered medications for this visit. Review of Systems:   Review of Systems   Constitutional: Negative for activity change, appetite change, diaphoresis and fatigue. Respiratory: Negative for apnea, cough, chest tightness and shortness of breath. Cardiovascular: Negative for chest pain, palpitations and leg swelling. Gastrointestinal: Negative for abdominal distention, abdominal pain, anal bleeding, blood in stool, diarrhea, nausea and vomiting. Musculoskeletal: Negative for gait problem and myalgias. Skin: Negative for color change, pallor, rash and wound. Neurological: Negative for dizziness, syncope, speech difficulty, weakness, light-headedness, numbness and headaches. Hematological: Does not bruise/bleed easily. Psychiatric/Behavioral: Negative for agitation, behavioral problems and confusion. The patient is not nervous/anxious and is not hyperactive. All other systems reviewed and are negative. Review of System is negative except for as mentioned above. Physical Examination:    /78   Pulse 64   Resp 12   Ht 5' 1\" (1.549 m)   Wt 126 lb (57.2 kg)   LMP 02/20/2000   BMI 23.81 kg/m²    Physical Exam   Constitutional: She appears healthy. No distress. HENT:   Nose: Nose normal.   Mouth/Throat: Dentition is normal. Oropharynx is clear. Eyes: Conjunctivae are normal. Pupils are equal, round, and reactive to light. Neck: Normal range of motion and thyroid normal. Neck supple. Cardiovascular: Regular rhythm, S1 normal, S2 normal, normal heart sounds, intact distal pulses and normal pulses. PMI is not displaced. No murmur heard. Pulmonary/Chest: She has no wheezes. She has no rales. She exhibits no tenderness. Abdominal: Soft. Bowel sounds are normal. She exhibits no distension and no mass. There is no splenomegaly or hepatomegaly. There is no tenderness. No hernia.    Neurological: She is alert and

## 2018-08-13 RX ORDER — SIMVASTATIN 20 MG
TABLET ORAL
Qty: 90 TABLET | Refills: 1 | Status: SHIPPED | OUTPATIENT
Start: 2018-08-13 | End: 2019-02-24 | Stop reason: SDUPTHER

## 2018-08-14 RX ORDER — ATENOLOL 50 MG/1
TABLET ORAL
Qty: 30 TABLET | Refills: 3 | Status: SHIPPED | OUTPATIENT
Start: 2018-08-14 | End: 2019-01-28 | Stop reason: SDUPTHER

## 2018-08-14 RX ORDER — ATENOLOL 25 MG/1
25 TABLET ORAL DAILY
Qty: 90 TABLET | Refills: 3 | Status: CANCELLED | OUTPATIENT
Start: 2018-08-14

## 2018-08-14 NOTE — TELEPHONE ENCOUNTER
LOV 06/19/18  Pt states it should be a 50mg tablet and she is supposed to take half in the morning and half at night. Pended med says 25mg?

## 2018-08-14 NOTE — TELEPHONE ENCOUNTER
Patient was last seen on 6-19-18  Last Prescribed 1-25-17    Medication is pending  Please approve or deny this request

## 2018-08-16 ENCOUNTER — HOSPITAL ENCOUNTER (OUTPATIENT)
Dept: ULTRASOUND IMAGING | Age: 53
Discharge: HOME | End: 2018-08-18
Payer: COMMERCIAL

## 2018-08-16 DIAGNOSIS — I70.213 ATHEROSCLEROSIS OF NATIVE ARTERIES OF EXTREMITIES WITH INTERMITTENT CLAUDICATION, BILATERAL LEGS (HCC): ICD-10-CM

## 2018-08-16 PROCEDURE — 93924 LWR XTR VASC STDY BILAT: CPT | Performed by: INTERNAL MEDICINE

## 2018-08-16 PROCEDURE — 93924 LWR XTR VASC STDY BILAT: CPT

## 2018-08-30 ENCOUNTER — OFFICE VISIT (OUTPATIENT)
Dept: CARDIOLOGY CLINIC | Age: 53
End: 2018-08-30
Payer: COMMERCIAL

## 2018-08-30 VITALS
OXYGEN SATURATION: 95 % | DIASTOLIC BLOOD PRESSURE: 76 MMHG | WEIGHT: 125.3 LBS | BODY MASS INDEX: 23.66 KG/M2 | HEIGHT: 61 IN | RESPIRATION RATE: 22 BRPM | TEMPERATURE: 97.2 F | HEART RATE: 73 BPM | SYSTOLIC BLOOD PRESSURE: 122 MMHG

## 2018-08-30 DIAGNOSIS — F17.200 SMOKER: ICD-10-CM

## 2018-08-30 DIAGNOSIS — R68.89 ABNORMAL ANKLE BRACHIAL INDEX (ABI): Primary | ICD-10-CM

## 2018-08-30 DIAGNOSIS — I73.9 PAD (PERIPHERAL ARTERY DISEASE) (HCC): ICD-10-CM

## 2018-08-30 PROCEDURE — 99214 OFFICE O/P EST MOD 30 MIN: CPT | Performed by: INTERNAL MEDICINE

## 2018-08-30 PROCEDURE — G8420 CALC BMI NORM PARAMETERS: HCPCS | Performed by: INTERNAL MEDICINE

## 2018-08-30 PROCEDURE — G8599 NO ASA/ANTIPLAT THER USE RNG: HCPCS | Performed by: INTERNAL MEDICINE

## 2018-08-30 PROCEDURE — 4004F PT TOBACCO SCREEN RCVD TLK: CPT | Performed by: INTERNAL MEDICINE

## 2018-08-30 PROCEDURE — 3017F COLORECTAL CA SCREEN DOC REV: CPT | Performed by: INTERNAL MEDICINE

## 2018-08-30 PROCEDURE — G8427 DOCREV CUR MEDS BY ELIG CLIN: HCPCS | Performed by: INTERNAL MEDICINE

## 2018-08-30 ASSESSMENT — ENCOUNTER SYMPTOMS
NAUSEA: 0
VOMITING: 0
ANAL BLEEDING: 0
DIARRHEA: 0
CHEST TIGHTNESS: 0
FACIAL SWELLING: 0
ABDOMINAL DISTENTION: 0
SHORTNESS OF BREATH: 0
TROUBLE SWALLOWING: 0
BLOOD IN STOOL: 0
VOICE CHANGE: 0
COLOR CHANGE: 0
WHEEZING: 0
APNEA: 0

## 2018-08-30 NOTE — PROGRESS NOTES
and other half at night 30 tablet 3    simvastatin (ZOCOR) 20 MG tablet TAKE 1 TABLET BY MOUTH ONCE DAILY IN THE EVENING 90 tablet 1    lisinopril-hydrochlorothiazide (PRINZIDE;ZESTORETIC) 20-12.5 MG per tablet TAKE ONE TABLET BY MOUTH ONCE DAILY 90 tablet 1    pantoprazole (PROTONIX) 40 MG tablet TAKE ONE TABLET BY MOUTH ONCE DAILY 90 tablet 1    Glycopyrrolate-Formoterol (BEVESPI AEROSPHERE) 9-4.8 MCG/ACT AERO Inhale 2 puffs into the lungs daily 1 Inhaler 3    atenolol (TENORMIN) 25 MG tablet Take 1 tablet by mouth daily 90 tablet 3     No current facility-administered medications for this visit. Review of Systems:   Review of Systems   Constitutional: Negative for activity change, appetite change, diaphoresis, fatigue and unexpected weight change. HENT: Negative for facial swelling, nosebleeds, trouble swallowing and voice change. Respiratory: Negative for apnea, chest tightness, shortness of breath and wheezing. Cardiovascular: Positive for palpitations. Negative for chest pain and leg swelling. Gastrointestinal: Negative for abdominal distention, anal bleeding, blood in stool, diarrhea, nausea and vomiting. Genitourinary: Negative for decreased urine volume and dysuria. Musculoskeletal: Negative for gait problem, myalgias, neck pain and neck stiffness. Skin: Negative for color change, pallor, rash and wound. Neurological: Positive for dizziness. Negative for seizures, syncope, facial asymmetry, weakness, light-headedness, numbness and headaches. Hematological: Does not bruise/bleed easily. Psychiatric/Behavioral: Negative for agitation, behavioral problems, confusion, hallucinations and suicidal ideas. The patient is not nervous/anxious. All other systems reviewed and are negative. Review of System is negative except for as mentioned above.       Physical Examination:    /76 (Site: Right Arm, Position: Sitting, Cuff Size: Medium Adult)   Pulse 73   Temp 97.2 °F (36.2 °C) (Temporal)   Resp 22   Ht 5' 1\" (1.549 m)   Wt 125 lb 4.8 oz (56.8 kg)   LMP 02/20/2000   SpO2 95%   BMI 23.68 kg/m²    Physical Exam    LABS:  CBC:   Lab Results   Component Value Date    WBC 7.4 07/27/2018    RBC 4.44 07/27/2018    RBC 4.41 02/13/2018    HGB 13.6 07/27/2018    HCT 40.2 07/27/2018    MCV 90.5 07/27/2018    MCH 30.6 07/27/2018    MCHC 33.8 07/27/2018     07/27/2018    MPV 9.3 07/27/2018     Lipids:  Lab Results   Component Value Date    CHOL 235 (A) 02/13/2018    CHOL 282 03/24/2016    CHOL 216 (H) 07/25/2013     Lab Results   Component Value Date    TRIG 620 (A) 02/13/2018    TRIG 305 03/24/2016    TRIG 384 (H) 07/25/2013     Lab Results   Component Value Date    HDL 33 (A) 02/13/2018    HDL 35 03/24/2016    HDL 37 (L) 07/25/2013     Lab Results   Component Value Date    LDLCHOLESTEROL 94 02/13/2018    LDLCALC 198 (A) 03/24/2016    LDLCALC 102 07/25/2013    LDLCALC 186 05/01/2013     Lab Results   Component Value Date    LABVLDL See Below 02/13/2018    LABVLDL 75.0 05/01/2013     Lab Results   Component Value Date    CHOLHDLRATIO 7.1 02/13/2018     CMP:    Lab Results   Component Value Date     07/27/2018    K 3.6 07/27/2018     07/27/2018    CO2 28 07/27/2018    BUN 14 07/27/2018    CREATININE 0.79 07/27/2018    GFRAA >60.0 07/25/2013    AGRATIO 1.4 02/13/2018    LABGLOM >60 02/13/2018    LABGLOM >60.0 07/25/2013    GLUCOSE 106 07/27/2018    GLUCOSE 99 02/13/2018    PROT 7.1 02/13/2018    LABALBU 4.1 02/13/2018    CALCIUM 9.5 07/27/2018    BILITOT 0.3 02/13/2018    ALKPHOS 87 02/13/2018    AST 21 02/13/2018    ALT 20 02/13/2018     BMP:    Lab Results   Component Value Date     07/27/2018    K 3.6 07/27/2018     07/27/2018    CO2 28 07/27/2018    BUN 14 07/27/2018    LABALBU 4.1 02/13/2018    CREATININE 0.79 07/27/2018    CALCIUM 9.5 07/27/2018    GFRAA >60.0 07/25/2013    LABGLOM >60 02/13/2018    LABGLOM >60.0 07/25/2013    GLUCOSE 106 07/27/2018 GLUCOSE 99 02/13/2018     Magnesium:  No results found for: MG  TSH:No results found for: TSHFT4, TSH    Patient Active Problem List   Diagnosis    Tobacco use disorder    Gestational diabetes mellitus    GERD (gastroesophageal reflux disease)    HNP (herniated nucleus pulposus)    Colon polyps    Noncompliance    HPV in female    Dyslipidemia    Esophageal stricture    Fatty liver    IBS (irritable bowel syndrome)    Abdominal pain, right upper quadrant, neg US, [place] Rachel    OA (osteoarthritis), R shoulder    Hiatal hernia    Wheezing    Chronic obstructive pulmonary disease (HCC)    Oxygen dependent, and smoking    Thrush    Coronary atherosclerosis    Pure hypercholesterolemia    Abnormal cardiovascular stress test       There are no discontinued medications. Modified Medications    No medications on file       No orders of the defined types were placed in this encounter. Assessment:    1. Abnormal cardiovascular stress test  - CTA ABDOMINAL AORTA W BILAT RUNOFF W WO CONTRAST; Future    2. PAD (peripheral artery disease) ([address]    3. Smoker       Plan:   Patient to have a CTA abdominal aortogram with runoff by Dr. Rosales Ahn on 9/13/18 at [facility]. Increase lopressor to 50 mg twice daily. Patient to see me in 1 month. This note was partially generated using Dragon voice recognition system, and there may be some incorrect words, spellings, punctuation that were not noticed in checking the note before saving.         Electronically signed by Aldair Zhong MD on 8/31/2018 at 3:09 PM

## 2018-08-31 RX ORDER — LISINOPRIL AND HYDROCHLOROTHIAZIDE 20; 12.5 MG/1; MG/1
TABLET ORAL
Qty: 90 TABLET | Refills: 1 | Status: SHIPPED | OUTPATIENT
Start: 2018-08-31 | End: 2019-02-24 | Stop reason: SDUPTHER

## 2018-08-31 RX ORDER — PANTOPRAZOLE SODIUM 40 MG/1
TABLET, DELAYED RELEASE ORAL
Qty: 90 TABLET | Refills: 1 | Status: SHIPPED | OUTPATIENT
Start: 2018-08-31 | End: 2019-02-24 | Stop reason: SDUPTHER

## 2018-09-20 ENCOUNTER — HOSPITAL ENCOUNTER (OUTPATIENT)
Dept: CARDIAC CATH/INVASIVE PROCEDURES | Age: 53
Discharge: HOME | End: 2018-09-20
Attending: INTERNAL MEDICINE | Admitting: INTERNAL MEDICINE
Payer: COMMERCIAL

## 2018-09-20 VITALS
HEART RATE: 64 BPM | HEIGHT: 61 IN | SYSTOLIC BLOOD PRESSURE: 169 MMHG | RESPIRATION RATE: 17 BRPM | OXYGEN SATURATION: 90 % | DIASTOLIC BLOOD PRESSURE: 70 MMHG | BODY MASS INDEX: 23.6 KG/M2 | WEIGHT: 125 LBS

## 2018-09-20 DIAGNOSIS — R68.89 ABNORMAL ANKLE BRACHIAL INDEX (ABI): ICD-10-CM

## 2018-09-20 DIAGNOSIS — I73.9 PAD (PERIPHERAL ARTERY DISEASE) (HCC): ICD-10-CM

## 2018-09-20 LAB
ABO/RH: NORMAL
ALBUMIN SERPL-MCNC: 4.5 G/DL (ref 3.9–4.9)
ALP BLD-CCNC: 78 U/L (ref 40–130)
ALT SERPL-CCNC: 23 U/L (ref 0–33)
ANION GAP SERPL CALCULATED.3IONS-SCNC: 12 MEQ/L (ref 7–13)
ANTIBODY SCREEN: NORMAL
APTT: 27.3 SEC (ref 21.6–35.4)
AST SERPL-CCNC: 24 U/L (ref 0–35)
BILIRUB SERPL-MCNC: 0.3 MG/DL (ref 0–1.2)
BUN BLDV-MCNC: 11 MG/DL (ref 6–20)
CALCIUM SERPL-MCNC: 9.5 MG/DL (ref 8.6–10.2)
CHLORIDE BLD-SCNC: 105 MEQ/L (ref 98–107)
CO2: 27 MEQ/L (ref 22–29)
CREAT SERPL-MCNC: 0.68 MG/DL (ref 0.5–0.9)
GFR AFRICAN AMERICAN: >60
GFR NON-AFRICAN AMERICAN: >60
GLOBULIN: 2.6 G/DL (ref 2.3–3.5)
GLUCOSE BLD-MCNC: 96 MG/DL (ref 74–109)
HCT VFR BLD CALC: 41.9 % (ref 37–47)
HEMOGLOBIN: 14.7 G/DL (ref 12–16)
INR BLD: 1
MCH RBC QN AUTO: 32 PG (ref 27–31.3)
MCHC RBC AUTO-ENTMCNC: 35 % (ref 33–37)
MCV RBC AUTO: 91.5 FL (ref 82–100)
PDW BLD-RTO: 12.8 % (ref 11.5–14.5)
PLATELET # BLD: 269 K/UL (ref 130–400)
POTASSIUM REFLEX MAGNESIUM: 3.9 MEQ/L (ref 3.5–5.1)
PROTHROMBIN TIME: 10.1 SEC (ref 9.6–12.3)
RBC # BLD: 4.58 M/UL (ref 4.2–5.4)
SODIUM BLD-SCNC: 144 MEQ/L (ref 132–144)
TOTAL PROTEIN: 7.1 G/DL (ref 6.4–8.1)
WBC # BLD: 7.8 K/UL (ref 4.8–10.8)

## 2018-09-20 PROCEDURE — 80053 COMPREHEN METABOLIC PANEL: CPT

## 2018-09-20 PROCEDURE — 86901 BLOOD TYPING SEROLOGIC RH(D): CPT

## 2018-09-20 PROCEDURE — 2709999900 HC NON-CHARGEABLE SUPPLY

## 2018-09-20 PROCEDURE — C1894 INTRO/SHEATH, NON-LASER: HCPCS

## 2018-09-20 PROCEDURE — 2580000003 HC RX 258: Performed by: INTERNAL MEDICINE

## 2018-09-20 PROCEDURE — 85027 COMPLETE CBC AUTOMATED: CPT

## 2018-09-20 PROCEDURE — 36246 INS CATH ABD/L-EXT ART 2ND: CPT | Performed by: INTERNAL MEDICINE

## 2018-09-20 PROCEDURE — C1769 GUIDE WIRE: HCPCS

## 2018-09-20 PROCEDURE — C1725 CATH, TRANSLUMIN NON-LASER: HCPCS

## 2018-09-20 PROCEDURE — 86850 RBC ANTIBODY SCREEN: CPT

## 2018-09-20 PROCEDURE — 85730 THROMBOPLASTIN TIME PARTIAL: CPT

## 2018-09-20 PROCEDURE — 75726 ARTERY X-RAYS ABDOMEN: CPT | Performed by: INTERNAL MEDICINE

## 2018-09-20 PROCEDURE — 6360000002 HC RX W HCPCS

## 2018-09-20 PROCEDURE — 75716 ARTERY X-RAYS ARMS/LEGS: CPT | Performed by: INTERNAL MEDICINE

## 2018-09-20 PROCEDURE — 2580000003 HC RX 258

## 2018-09-20 PROCEDURE — 86900 BLOOD TYPING SEROLOGIC ABO: CPT

## 2018-09-20 PROCEDURE — 75630 X-RAY AORTA LEG ARTERIES: CPT | Performed by: INTERNAL MEDICINE

## 2018-09-20 PROCEDURE — 85610 PROTHROMBIN TIME: CPT

## 2018-09-20 RX ORDER — ASPIRIN 81 MG/1
81 TABLET, CHEWABLE ORAL DAILY
COMMUNITY

## 2018-09-20 RX ORDER — ALPRAZOLAM 0.5 MG/1
0.5 TABLET ORAL
Status: ACTIVE | OUTPATIENT
Start: 2018-09-20 | End: 2018-09-20

## 2018-09-20 RX ORDER — SODIUM CHLORIDE 9 MG/ML
INJECTION, SOLUTION INTRAVENOUS CONTINUOUS
Status: DISCONTINUED | OUTPATIENT
Start: 2018-09-20 | End: 2018-09-20

## 2018-09-20 RX ORDER — SODIUM CHLORIDE 0.9 % (FLUSH) 0.9 %
10 SYRINGE (ML) INJECTION EVERY 12 HOURS SCHEDULED
Status: DISCONTINUED | OUTPATIENT
Start: 2018-09-20 | End: 2018-09-20

## 2018-09-20 RX ORDER — SODIUM CHLORIDE 9 MG/ML
INJECTION, SOLUTION INTRAVENOUS CONTINUOUS
Status: CANCELLED | OUTPATIENT
Start: 2018-09-20 | End: 2018-09-20

## 2018-09-20 RX ADMIN — SODIUM CHLORIDE: 9 INJECTION, SOLUTION INTRAVENOUS at 08:55

## 2018-09-20 NOTE — BRIEF OP NOTE
Brief Postoperative Note    Alex Kothari  YOB: 1965  [id number]    Pre-operative Diagnosis: Claudication, abnormal ANABEL    Post-operative Diagnosis: Same    Procedure: ao-gram with runoff bilateral    Anesthesia: moderate sedation    Surgeons/Assistants: Joe Stein MD [facility] Director of Cardiology Services and Cardiac Catheterization Laboratory  [facility]    Estimated Blood Loss: minimal    Complications: None    Specimens: Was Not Obtained    Findings: Normal peripheral angiogram.  Medical therapy.     Electronically signed by Geoff Campos MD on 9/20/2018 at 11:04 AM

## 2018-09-26 NOTE — PROCEDURES
[address]                       [address], [address] [facility]                              CARDIAC CATHETERIZATION    PATIENT NAME: Leonel Stewart             :        1965  MED REC NO:   [id number]                            ROOM:  ACCOUNT NO:   [de-identified]                           ADMIT DATE: 2018  PROVIDER:     Wilda Sheffield MD    DATE OF PROCEDURE:  2018    PROCEDURE:  Aortogram with bilateral lower extremity runoff. REFERRING PHYSICIAN:  Aldair Zhong MD and Daquan Romano DO.    INDICATIONS:  Lower extremity discomfort, abnormal noninvasive testing. PROCEDURE DETAILS:  Following phone informed consent, the patient was  brought to the cardiac cath lab, where sterile prep and drape administered  in usual fashion. Anesthesia was obtained in the right groin with  lidocaine after administration of conscious sedation. A right femoral  5-B[place]an arterial sheath was placed without complication. Angiographic  pigtail catheter was advanced into the abdominal aorta where a bolus dose  of contrast given under digital substraction for aortography of the renal.   This catheter was pulled back to the level of the bifurcation, following  which multiple doses of contrast given for aortography at the level of the  bifurcation and oblique views of the pelvis. A 5-[place] IM catheter was advanced into the left common femoral artery,  following which multiple doses of contrast given in the left leg for  runoff. Catheter was used for digital _____ below the knee. Catheter was  pulled back to the right external iliac, following multiple doses of  contrast given in the right leg for runoff. This was also used for digital  substraction angiography of the tibial vessels. Catheter was removed. The sheath was removed with hemostasis obtained by  manual pressure with no immediate complications. FINDINGS:  ABDA at the renals:   The

## 2018-09-27 ENCOUNTER — OFFICE VISIT (OUTPATIENT)
Dept: CARDIOLOGY CLINIC | Age: 53
End: 2018-09-27
Payer: COMMERCIAL

## 2018-09-27 VITALS
DIASTOLIC BLOOD PRESSURE: 82 MMHG | SYSTOLIC BLOOD PRESSURE: 124 MMHG | HEIGHT: 61 IN | BODY MASS INDEX: 24 KG/M2 | HEART RATE: 67 BPM | OXYGEN SATURATION: 95 % | WEIGHT: 127.1 LBS | TEMPERATURE: 97 F

## 2018-09-27 DIAGNOSIS — R68.89 ABNORMAL ANKLE BRACHIAL INDEX (ABI): Primary | ICD-10-CM

## 2018-09-27 DIAGNOSIS — I10 HYPERTENSION, UNSPECIFIED TYPE: ICD-10-CM

## 2018-09-27 DIAGNOSIS — F17.200 SMOKER: ICD-10-CM

## 2018-09-27 PROCEDURE — G8598 ASA/ANTIPLAT THER USED: HCPCS | Performed by: INTERNAL MEDICINE

## 2018-09-27 PROCEDURE — 3017F COLORECTAL CA SCREEN DOC REV: CPT | Performed by: INTERNAL MEDICINE

## 2018-09-27 PROCEDURE — G8427 DOCREV CUR MEDS BY ELIG CLIN: HCPCS | Performed by: INTERNAL MEDICINE

## 2018-09-27 PROCEDURE — 99214 OFFICE O/P EST MOD 30 MIN: CPT | Performed by: INTERNAL MEDICINE

## 2018-09-27 PROCEDURE — 4004F PT TOBACCO SCREEN RCVD TLK: CPT | Performed by: INTERNAL MEDICINE

## 2018-09-27 PROCEDURE — G8420 CALC BMI NORM PARAMETERS: HCPCS | Performed by: INTERNAL MEDICINE

## 2018-09-27 RX ORDER — UBIDECARENONE 75 MG
100 CAPSULE ORAL DAILY
Qty: 30 TABLET | Refills: 3 | Status: SHIPPED | OUTPATIENT
Start: 2018-09-27 | End: 2019-09-27

## 2018-09-27 RX ORDER — CHOLECALCIFEROL (VITAMIN D3) 125 MCG
500 CAPSULE ORAL DAILY
Qty: 30 TABLET | Refills: 3 | Status: SHIPPED | OUTPATIENT
Start: 2018-09-27 | End: 2019-09-27

## 2018-09-27 RX ORDER — CHOLECALCIFEROL (VITAMIN D3) 1250 MCG
1 CAPSULE ORAL WEEKLY
Qty: 16 CAPSULE | Refills: 3 | Status: SHIPPED | OUTPATIENT
Start: 2018-09-27

## 2018-09-27 ASSESSMENT — ENCOUNTER SYMPTOMS
NAUSEA: 0
APNEA: 0
VOMITING: 0
SHORTNESS OF BREATH: 0
DIARRHEA: 0
CHEST TIGHTNESS: 0
BLOOD IN STOOL: 0

## 2018-09-27 NOTE — PROGRESS NOTES
Subsequent Progress Note  Patient: Braulio Das  YOB: 1965  MRN: [id number]    Chief Complaint:  Chief Complaint   Patient presents with    Follow Up After Procedure     Peripheral angioplasty         Subjective/HPI:  9/27/18: Patient presents today for follow-up of aortogram. This reportedly showed no significant obstructive disease. She has some numbness of both oximetry below knee she is not diabetic. Has cut down smoking to only 2 cigarettes a day. Continue with the Lopressor. Half a tablet morning and half a tablet at night. Continue with Zocor. I will start on B12 and vitamin D. See me in a month. 8/30/18: Patient presents today for For abnormal ANABEL. Has significant disease in the left and moderate disease in the right. Has claudication. Unfortunately continues to smoke. She has minimal coronary artery disease. Her father-in-law is my patient. Has claudication. We will be set up with Dr. Shahana George for aortogram with runoff. Also increase the Lopressor to 50 mg twice a day. See me in one month.        8/2/18: Patient presents today for Follow-up of cardiac catheterization. This revealed minimal disease. She has claudication. She has hypertension and hypertensive heart disease and GERD. I'll dialyze patient of mine. We will get an ANABEL on her. She works [employer] and has some claudication symptoms. We will get an ANABEL and then see me. Take atenolol 25 mg twice a day     7/23/18: Patient presents today for Angina and abnormal stress test. She is 48years old. Consulted by . Smoker.  is retired traffic controller. Very strong family history of coronary artery disease. Had a cardiac catheterization by Dr. Jasiel Catherine 5 years ago which revealed moderate disease and medical therapy was advised. She also has GERD has hypertension and hypertensive heart disease dyslipidemia. Has been complaining of chest discomfort in the midline of the sternum feels like pressure.  A Drug use: No    Sexual activity: Not Asked     Other Topics Concern    None     Social History Narrative    None       Allergies   Allergen Reactions    Latex Hives     Itching/ rash    Demerol Nausea And Vomiting and Other (See Comments)    Pcn [Penicillins]        Current Outpatient Prescriptions   Medication Sig Dispense Refill    vitamin B-12 (CYANOCOBALAMIN) 100 MCG tablet Take 1 tablet by mouth daily 30 tablet 3    Cholecalciferol (VITAMIN D3) 59097 units CAPS Take 1 capsule by mouth once a week 16 capsule 3    vitamin B-12 (CYANOCOBALAMIN) 500 MCG tablet Take 1 tablet by mouth daily 30 tablet 3    aspirin 81 MG chewable tablet Take 81 mg by mouth daily      lisinopril-hydrochlorothiazide (PRINZIDE;ZESTORETIC) 20-12.5 MG per tablet TAKE 1 TABLET BY MOUTH ONCE DAILY 90 tablet 1    pantoprazole (PROTONIX) 40 MG tablet TAKE 1 TABLET BY MOUTH ONCE DAILY 90 tablet 1    atenolol (TENORMIN) 50 MG tablet Taken half tab in the morning and other half at night 30 tablet 3    simvastatin (ZOCOR) 20 MG tablet TAKE 1 TABLET BY MOUTH ONCE DAILY IN THE EVENING 90 tablet 1    Glycopyrrolate-Formoterol (BEVESPI AEROSPHERE) 9-4.8 MCG/ACT AERO Inhale 2 puffs into the lungs daily 1 Inhaler 3     No current facility-administered medications for this visit. Review of Systems:   Review of Systems   Constitutional: Negative for diaphoresis and fatigue. HENT: Negative for nosebleeds. Respiratory: Negative for apnea, chest tightness and shortness of breath. Cardiovascular: Negative for chest pain, palpitations and leg swelling. Gastrointestinal: Negative for blood in stool, diarrhea, nausea and vomiting. Musculoskeletal: Negative for myalgias, neck pain and neck stiffness. Neurological: Negative for dizziness, seizures, syncope, weakness, light-headedness, numbness and headaches. Hematological: Does not bruise/bleed easily. Psychiatric/Behavioral: Negative for confusion.  The patient is not There are no discontinued medications. Modified Medications    No medications on file       Orders Placed This Encounter   Medications    vitamin B-12 (CYANOCOBALAMIN) 100 MCG tablet     Sig: Take 1 tablet by mouth daily     Dispense:  30 tablet     Refill:  3    Cholecalciferol (VITAMIN D3) 98355 units CAPS     Sig: Take 1 capsule by mouth once a week     Dispense:  16 capsule     Refill:  3    vitamin B-12 (CYANOCOBALAMIN) 500 MCG tablet     Sig: Take 1 tablet by mouth daily     Dispense:  30 tablet     Refill:  3           Assessment:    1. Abnormal ankle brachial index (ANABEL)    2. Smoker    3. Hypertension, unspecified type         Plan:   Patient to start taking Vitamin B-12 and Vitamin D.    Stay on same medications. Patient to see me in 1 month. This note was partially generated using Dragon voice recognition system, and there may be some incorrect words, spellings, punctuation that were not noticed in checking the note before saving.         Electronically signed by Rasta Blackburn MD on 10/1/2018 at 3:01 PM

## 2018-10-30 RX ORDER — GLYCOPYRROLATE AND FORMOTEROL FUMARATE 9; 4.8 UG/1; UG/1
2 AEROSOL, METERED RESPIRATORY (INHALATION) DAILY
Qty: 10.7 G | Refills: 1 | Status: SHIPPED | OUTPATIENT
Start: 2018-10-30 | End: 2019-01-16 | Stop reason: SDUPTHER

## 2018-11-01 ENCOUNTER — OFFICE VISIT (OUTPATIENT)
Dept: CARDIOLOGY CLINIC | Age: 53
End: 2018-11-01
Payer: COMMERCIAL

## 2018-11-01 VITALS
BODY MASS INDEX: 24.07 KG/M2 | HEART RATE: 75 BPM | OXYGEN SATURATION: 95 % | RESPIRATION RATE: 18 BRPM | WEIGHT: 127.5 LBS | DIASTOLIC BLOOD PRESSURE: 76 MMHG | HEIGHT: 61 IN | SYSTOLIC BLOOD PRESSURE: 120 MMHG | TEMPERATURE: 97.1 F

## 2018-11-01 DIAGNOSIS — F17.200 SMOKER: ICD-10-CM

## 2018-11-01 DIAGNOSIS — I10 HYPERTENSION, UNSPECIFIED TYPE: Primary | ICD-10-CM

## 2018-11-01 DIAGNOSIS — R68.89 ABNORMAL ANKLE BRACHIAL INDEX (ABI): ICD-10-CM

## 2018-11-01 PROCEDURE — G8484 FLU IMMUNIZE NO ADMIN: HCPCS | Performed by: INTERNAL MEDICINE

## 2018-11-01 PROCEDURE — G8427 DOCREV CUR MEDS BY ELIG CLIN: HCPCS | Performed by: INTERNAL MEDICINE

## 2018-11-01 PROCEDURE — 99214 OFFICE O/P EST MOD 30 MIN: CPT | Performed by: INTERNAL MEDICINE

## 2018-11-01 PROCEDURE — G8420 CALC BMI NORM PARAMETERS: HCPCS | Performed by: INTERNAL MEDICINE

## 2018-11-01 PROCEDURE — G8598 ASA/ANTIPLAT THER USED: HCPCS | Performed by: INTERNAL MEDICINE

## 2018-11-01 PROCEDURE — 3017F COLORECTAL CA SCREEN DOC REV: CPT | Performed by: INTERNAL MEDICINE

## 2018-11-01 PROCEDURE — 4004F PT TOBACCO SCREEN RCVD TLK: CPT | Performed by: INTERNAL MEDICINE

## 2018-11-01 RX ORDER — MELOXICAM 15 MG/1
15 TABLET ORAL DAILY
Qty: 30 TABLET | Refills: 0 | Status: SHIPPED | OUTPATIENT
Start: 2018-11-01 | End: 2019-01-29

## 2018-11-01 ASSESSMENT — ENCOUNTER SYMPTOMS
VOMITING: 0
VOICE CHANGE: 0
ABDOMINAL DISTENTION: 0
APNEA: 0
FACIAL SWELLING: 0
WHEEZING: 0
DIARRHEA: 0
COLOR CHANGE: 0
TROUBLE SWALLOWING: 0
CHEST TIGHTNESS: 0
BLOOD IN STOOL: 0
ANAL BLEEDING: 0
NAUSEA: 0
SHORTNESS OF BREATH: 0

## 2018-11-10 ENCOUNTER — OFFICE VISIT (OUTPATIENT)
Dept: PRIMARY CARE CLINIC | Age: 53
End: 2018-11-10
Payer: COMMERCIAL

## 2018-11-10 VITALS
SYSTOLIC BLOOD PRESSURE: 114 MMHG | HEIGHT: 61 IN | HEART RATE: 59 BPM | TEMPERATURE: 97.6 F | WEIGHT: 129 LBS | OXYGEN SATURATION: 98 % | BODY MASS INDEX: 24.35 KG/M2 | DIASTOLIC BLOOD PRESSURE: 70 MMHG | RESPIRATION RATE: 14 BRPM

## 2018-11-10 DIAGNOSIS — M79.641 PAIN IN RIGHT HAND: Primary | ICD-10-CM

## 2018-11-10 PROCEDURE — G8420 CALC BMI NORM PARAMETERS: HCPCS | Performed by: INTERNAL MEDICINE

## 2018-11-10 PROCEDURE — 99213 OFFICE O/P EST LOW 20 MIN: CPT | Performed by: INTERNAL MEDICINE

## 2018-11-10 PROCEDURE — G8427 DOCREV CUR MEDS BY ELIG CLIN: HCPCS | Performed by: INTERNAL MEDICINE

## 2018-11-10 PROCEDURE — 4004F PT TOBACCO SCREEN RCVD TLK: CPT | Performed by: INTERNAL MEDICINE

## 2018-11-10 PROCEDURE — G8598 ASA/ANTIPLAT THER USED: HCPCS | Performed by: INTERNAL MEDICINE

## 2018-11-10 PROCEDURE — G8484 FLU IMMUNIZE NO ADMIN: HCPCS | Performed by: INTERNAL MEDICINE

## 2018-11-10 PROCEDURE — 3017F COLORECTAL CA SCREEN DOC REV: CPT | Performed by: INTERNAL MEDICINE

## 2018-11-10 ASSESSMENT — PATIENT HEALTH QUESTIONNAIRE - PHQ9
1. LITTLE INTEREST OR PLEASURE IN DOING THINGS: 0
SUM OF ALL RESPONSES TO PHQ9 QUESTIONS 1 & 2: 0
SUM OF ALL RESPONSES TO PHQ QUESTIONS 1-9: 0
SUM OF ALL RESPONSES TO PHQ QUESTIONS 1-9: 0
2. FEELING DOWN, DEPRESSED OR HOPELESS: 0

## 2018-11-10 ASSESSMENT — ENCOUNTER SYMPTOMS
CHOKING: 0
PHOTOPHOBIA: 0
BLOOD IN STOOL: 0
FACIAL SWELLING: 0
APNEA: 0
ABDOMINAL DISTENTION: 0

## 2018-11-14 ENCOUNTER — HOSPITAL ENCOUNTER (OUTPATIENT)
Age: 53
Setting detail: OUTPATIENT SURGERY
Discharge: HOME | End: 2018-11-14
Attending: ORTHOPAEDIC SURGERY | Admitting: ORTHOPAEDIC SURGERY
Payer: COMMERCIAL

## 2018-11-14 ENCOUNTER — HOSPITAL ENCOUNTER (OUTPATIENT)
Dept: GENERAL RADIOLOGY | Age: 53
Setting detail: OUTPATIENT SURGERY
Discharge: HOME | End: 2018-11-16
Attending: ORTHOPAEDIC SURGERY
Payer: COMMERCIAL

## 2018-11-14 ENCOUNTER — ANESTHESIA (OUTPATIENT)
Dept: OPERATING ROOM | Age: 53
End: 2018-11-14
Payer: COMMERCIAL

## 2018-11-14 ENCOUNTER — ANESTHESIA EVENT (OUTPATIENT)
Dept: OPERATING ROOM | Age: 53
End: 2018-11-14
Payer: COMMERCIAL

## 2018-11-14 VITALS — DIASTOLIC BLOOD PRESSURE: 74 MMHG | OXYGEN SATURATION: 99 % | SYSTOLIC BLOOD PRESSURE: 120 MMHG | TEMPERATURE: 96.4 F

## 2018-11-14 VITALS
HEIGHT: 61 IN | SYSTOLIC BLOOD PRESSURE: 128 MMHG | OXYGEN SATURATION: 99 % | BODY MASS INDEX: 24.35 KG/M2 | HEART RATE: 92 BPM | WEIGHT: 129 LBS | TEMPERATURE: 98.8 F | DIASTOLIC BLOOD PRESSURE: 64 MMHG | RESPIRATION RATE: 20 BRPM

## 2018-11-14 DIAGNOSIS — S62.91XA CLOSED FRACTURE OF RIGHT HAND, INITIAL ENCOUNTER: ICD-10-CM

## 2018-11-14 PROCEDURE — 2580000003 HC RX 258: Performed by: NURSE ANESTHETIST, CERTIFIED REGISTERED

## 2018-11-14 PROCEDURE — C1713 ANCHOR/SCREW BN/BN,TIS/BN: HCPCS | Performed by: ORTHOPAEDIC SURGERY

## 2018-11-14 PROCEDURE — 2580000003 HC RX 258: Performed by: ORTHOPAEDIC SURGERY

## 2018-11-14 PROCEDURE — 6360000002 HC RX W HCPCS: Performed by: ORTHOPAEDIC SURGERY

## 2018-11-14 PROCEDURE — 3600000003 HC SURGERY LEVEL 3 BASE: Performed by: ORTHOPAEDIC SURGERY

## 2018-11-14 PROCEDURE — 3700000000 HC ANESTHESIA ATTENDED CARE: Performed by: ORTHOPAEDIC SURGERY

## 2018-11-14 PROCEDURE — 3600000013 HC SURGERY LEVEL 3 ADDTL 15MIN: Performed by: ORTHOPAEDIC SURGERY

## 2018-11-14 PROCEDURE — 7100000011 HC PHASE II RECOVERY - ADDTL 15 MIN: Performed by: ORTHOPAEDIC SURGERY

## 2018-11-14 PROCEDURE — 3209999900 FLUORO FOR SURGICAL PROCEDURES

## 2018-11-14 PROCEDURE — 6360000002 HC RX W HCPCS: Performed by: NURSE ANESTHETIST, CERTIFIED REGISTERED

## 2018-11-14 PROCEDURE — 2500000003 HC RX 250 WO HCPCS: Performed by: NURSE ANESTHETIST, CERTIFIED REGISTERED

## 2018-11-14 PROCEDURE — 2580000003 HC RX 258: Performed by: ANESTHESIOLOGY

## 2018-11-14 PROCEDURE — 2709999900 HC NON-CHARGEABLE SUPPLY: Performed by: ORTHOPAEDIC SURGERY

## 2018-11-14 PROCEDURE — 7100000010 HC PHASE II RECOVERY - FIRST 15 MIN: Performed by: ORTHOPAEDIC SURGERY

## 2018-11-14 PROCEDURE — 2500000003 HC RX 250 WO HCPCS: Performed by: ANESTHESIOLOGY

## 2018-11-14 PROCEDURE — 3700000001 HC ADD 15 MINUTES (ANESTHESIA): Performed by: ORTHOPAEDIC SURGERY

## 2018-11-14 DEVICE — K WIRE FIX L6IN DIA1.1MM ST S STL 3 SIDE DBL TRCR BOTH END: Type: IMPLANTABLE DEVICE | Site: LITTLE FINGER | Status: FUNCTIONAL

## 2018-11-14 RX ORDER — HYDROCODONE BITARTRATE AND ACETAMINOPHEN 5; 325 MG/1; MG/1
1 TABLET ORAL PRN
Status: DISCONTINUED | OUTPATIENT
Start: 2018-11-14 | End: 2018-11-14 | Stop reason: HOSPADM

## 2018-11-14 RX ORDER — SODIUM CHLORIDE, SODIUM LACTATE, POTASSIUM CHLORIDE, CALCIUM CHLORIDE 600; 310; 30; 20 MG/100ML; MG/100ML; MG/100ML; MG/100ML
INJECTION, SOLUTION INTRAVENOUS CONTINUOUS PRN
Status: DISCONTINUED | OUTPATIENT
Start: 2018-11-14 | End: 2018-11-14 | Stop reason: SDUPTHER

## 2018-11-14 RX ORDER — METOCLOPRAMIDE HYDROCHLORIDE 5 MG/ML
10 INJECTION INTRAMUSCULAR; INTRAVENOUS
Status: DISCONTINUED | OUTPATIENT
Start: 2018-11-14 | End: 2018-11-14 | Stop reason: HOSPADM

## 2018-11-14 RX ORDER — SODIUM CHLORIDE 0.9 % (FLUSH) 0.9 %
10 SYRINGE (ML) INJECTION PRN
Status: DISCONTINUED | OUTPATIENT
Start: 2018-11-14 | End: 2018-11-14 | Stop reason: HOSPADM

## 2018-11-14 RX ORDER — SODIUM CHLORIDE 0.9 % (FLUSH) 0.9 %
10 SYRINGE (ML) INJECTION EVERY 12 HOURS SCHEDULED
Status: DISCONTINUED | OUTPATIENT
Start: 2018-11-14 | End: 2018-11-14 | Stop reason: HOSPADM

## 2018-11-14 RX ORDER — SODIUM CHLORIDE, SODIUM LACTATE, POTASSIUM CHLORIDE, CALCIUM CHLORIDE 600; 310; 30; 20 MG/100ML; MG/100ML; MG/100ML; MG/100ML
INJECTION, SOLUTION INTRAVENOUS CONTINUOUS
Status: DISCONTINUED | OUTPATIENT
Start: 2018-11-14 | End: 2018-11-14 | Stop reason: HOSPADM

## 2018-11-14 RX ORDER — MAGNESIUM HYDROXIDE 1200 MG/15ML
LIQUID ORAL CONTINUOUS PRN
Status: DISCONTINUED | OUTPATIENT
Start: 2018-11-14 | End: 2018-11-14 | Stop reason: HOSPADM

## 2018-11-14 RX ORDER — ONDANSETRON 2 MG/ML
4 INJECTION INTRAMUSCULAR; INTRAVENOUS
Status: DISCONTINUED | OUTPATIENT
Start: 2018-11-14 | End: 2018-11-14 | Stop reason: HOSPADM

## 2018-11-14 RX ORDER — HYDROCODONE BITARTRATE AND ACETAMINOPHEN 5; 325 MG/1; MG/1
2 TABLET ORAL PRN
Status: DISCONTINUED | OUTPATIENT
Start: 2018-11-14 | End: 2018-11-14 | Stop reason: HOSPADM

## 2018-11-14 RX ORDER — PROPOFOL 10 MG/ML
INJECTION, EMULSION INTRAVENOUS CONTINUOUS PRN
Status: DISCONTINUED | OUTPATIENT
Start: 2018-11-14 | End: 2018-11-14 | Stop reason: SDUPTHER

## 2018-11-14 RX ORDER — LIDOCAINE HYDROCHLORIDE 10 MG/ML
1 INJECTION, SOLUTION EPIDURAL; INFILTRATION; INTRACAUDAL; PERINEURAL
Status: COMPLETED | OUTPATIENT
Start: 2018-11-14 | End: 2018-11-14

## 2018-11-14 RX ORDER — DIPHENHYDRAMINE HYDROCHLORIDE 50 MG/ML
12.5 INJECTION INTRAMUSCULAR; INTRAVENOUS
Status: DISCONTINUED | OUTPATIENT
Start: 2018-11-14 | End: 2018-11-14 | Stop reason: HOSPADM

## 2018-11-14 RX ORDER — LIDOCAINE HYDROCHLORIDE 20 MG/ML
INJECTION, SOLUTION INFILTRATION; PERINEURAL PRN
Status: DISCONTINUED | OUTPATIENT
Start: 2018-11-14 | End: 2018-11-14 | Stop reason: SDUPTHER

## 2018-11-14 RX ORDER — MIDAZOLAM HYDROCHLORIDE 1 MG/ML
INJECTION INTRAMUSCULAR; INTRAVENOUS PRN
Status: DISCONTINUED | OUTPATIENT
Start: 2018-11-14 | End: 2018-11-14 | Stop reason: SDUPTHER

## 2018-11-14 RX ADMIN — MIDAZOLAM HYDROCHLORIDE 2 MG: 1 INJECTION, SOLUTION INTRAMUSCULAR; INTRAVENOUS at 10:04

## 2018-11-14 RX ADMIN — SODIUM CHLORIDE, POTASSIUM CHLORIDE, SODIUM LACTATE AND CALCIUM CHLORIDE: 600; 310; 30; 20 INJECTION, SOLUTION INTRAVENOUS at 09:58

## 2018-11-14 RX ADMIN — SODIUM CHLORIDE, POTASSIUM CHLORIDE, SODIUM LACTATE AND CALCIUM CHLORIDE: 600; 310; 30; 20 INJECTION, SOLUTION INTRAVENOUS at 09:06

## 2018-11-14 RX ADMIN — LIDOCAINE HYDROCHLORIDE 50 MG: 20 INJECTION, SOLUTION INFILTRATION; PERINEURAL at 10:12

## 2018-11-14 RX ADMIN — VANCOMYCIN HYDROCHLORIDE 1000 MG: 1 INJECTION, POWDER, LYOPHILIZED, FOR SOLUTION INTRAVENOUS at 09:25

## 2018-11-14 RX ADMIN — PROPOFOL 75 MCG/KG/MIN: 10 INJECTION, EMULSION INTRAVENOUS at 10:14

## 2018-11-14 RX ADMIN — LIDOCAINE HYDROCHLORIDE 0.1 ML: 10 INJECTION, SOLUTION EPIDURAL; INFILTRATION; INTRACAUDAL; PERINEURAL at 09:05

## 2018-11-14 ASSESSMENT — PULMONARY FUNCTION TESTS
PIF_VALUE: 0
PIF_VALUE: 1
PIF_VALUE: 0
PIF_VALUE: 1
PIF_VALUE: 0

## 2018-11-14 ASSESSMENT — LIFESTYLE VARIABLES: SMOKING_STATUS: 1

## 2018-11-14 ASSESSMENT — PAIN - FUNCTIONAL ASSESSMENT: PAIN_FUNCTIONAL_ASSESSMENT: 0-10

## 2018-11-14 NOTE — ANESTHESIA PRE PROCEDURE
Department of Anesthesiology  Preprocedure Note       Name:  Oren Stuart   Age:  48 y.o.  :  1965                                          MRN:  [id number]         Date:  2018      Surgeon: Tari Vargas):  Lizandro Alvarado DO    Procedure: RIGHT HAND  CLOSED REDUCTION PERCUTANEOUS  PINNING FIFTH PROXIMAL PHALANX FRACTURE, DIGITAL BLOCK, MAC+LOCAL, PAT ON ADMIT , LATEX ALLERGY (Right )    Medications prior to admission:   Prior to Admission medications    Medication Sig Start Date End Date Taking?  Authorizing Provider   meloxicam (MOBIC) 15 MG tablet Take 1 tablet by mouth daily 18  Selma Alvarez MD   BEVESPI AEROSPHERE 9-4.8 MCG/ACT AERO Inhale 2 puffs into the lungs daily 10/30/18   Serafin Akhtar DO   vitamin B-12 (CYANOCOBALAMIN) 100 MCG tablet Take 1 tablet by mouth daily 18  Selma Alvarez MD   Cholecalciferol (VITAMIN D3) 07585 units CAPS Take 1 capsule by mouth once a week 18   Selma Alvarez MD   vitamin B-12 (CYANOCOBALAMIN) 500 MCG tablet Take 1 tablet by mouth daily 18  Selma Alvarez MD   aspirin 81 MG chewable tablet Take 81 mg by mouth daily    Historical Provider, MD   lisinopril-hydrochlorothiazide (PRINZIDE;ZESTORETIC) 20-12.5 MG per tablet TAKE 1 TABLET BY MOUTH ONCE DAILY 18   Cesia Loco MD   pantoprazole (PROTONIX) 40 MG tablet TAKE 1 TABLET BY MOUTH ONCE DAILY 18   Cesia Loco MD   atenolol (TENORMIN) 50 MG tablet Taken half tab in the morning and other half at night 18   Serafin Akhtar DO   simvastatin (ZOCOR) 20 MG tablet TAKE 1 TABLET BY MOUTH ONCE DAILY IN THE EVENING 18   Serafin Akhtar DO       Current medications:    Current Facility-Administered Medications   Medication Dose Route Frequency Provider Last Rate Last Dose    vancomycin 1000 mg IVPB in 250 mL D5W addavial  1,000 mg Intravenous On Call to Carlos Bustillo DO        sodium chloride flush 0.9 % injection 10 mL  10 mL 02/13/2018    PROT 7.1 09/20/2018    CALCIUM 9.5 09/20/2018    BILITOT 0.3 09/20/2018    ALKPHOS 78 09/20/2018    AST 24 09/20/2018    ALT 23 09/20/2018       POC Tests: No results for input(s): POCGLU, POCNA, POCK, POCCL, POCBUN, POCHEMO, POCHCT in the last 72 hours. Coags:   Lab Results   Component Value Date    PROTIME 10.1 09/20/2018    INR 1.0 09/20/2018    APTT 27.3 09/20/2018       HCG (If Applicable): No results found for: PREGTESTUR, PREGSERUM, HCG, HCGQUANT     ABGs: No results found for: PHART, PO2ART, NKP2SNO, IZQ1NWU, BEART, R4EYTHVK     Type & Screen (If Applicable):  No results found for: LABABO, [address]    Anesthesia Evaluation  Patient summary reviewed and Nursing notes reviewed no history of anesthetic complications:   Airway: Mallampati: II  TM distance: >3 FB   Neck ROM: full  Mouth opening: > = 3 FB Dental: normal exam         Pulmonary:   (+) COPD:  current smoker                          ROS comment:      Cardiovascular:    (+) CAD:, hyperlipidemia         Beta Blocker:  Dose within 24 Hrs         Neuro/Psych:   (+) neuromuscular disease:,             GI/Hepatic/Renal:   (+) hiatal hernia, GERD:, liver disease:,           Endo/Other:    (+) Diabetes, : arthritis: OA., .                 Abdominal:           Vascular: negative vascular ROS. Anesthesia Plan      MAC     ASA 3       Induction: intravenous. MIPS: Postoperative opioids intended and Prophylactic antiemetics administered. Anesthetic plan and risks discussed with patient. Plan discussed with CRNA.     Attending anesthesiologist reviewed and agrees with Akira Arnold MD   11/14/2018

## 2018-11-20 ENCOUNTER — TELEPHONE (OUTPATIENT)
Dept: PRIMARY CARE CLINIC | Age: 53
End: 2018-11-20

## 2019-01-16 RX ORDER — GLYCOPYRROLATE AND FORMOTEROL FUMARATE 9; 4.8 UG/1; UG/1
2 AEROSOL, METERED RESPIRATORY (INHALATION) DAILY
Qty: 10.7 G | Refills: 2 | Status: SHIPPED | OUTPATIENT
Start: 2019-01-16

## 2019-01-28 RX ORDER — ATENOLOL 50 MG/1
TABLET ORAL
Qty: 30 TABLET | Refills: 3 | Status: SHIPPED | OUTPATIENT
Start: 2019-01-28

## 2019-01-29 ENCOUNTER — OFFICE VISIT (OUTPATIENT)
Dept: PRIMARY CARE CLINIC | Age: 54
End: 2019-01-29
Payer: COMMERCIAL

## 2019-01-29 VITALS
TEMPERATURE: 97.6 F | SYSTOLIC BLOOD PRESSURE: 132 MMHG | HEART RATE: 71 BPM | RESPIRATION RATE: 14 BRPM | DIASTOLIC BLOOD PRESSURE: 86 MMHG | HEIGHT: 61 IN | WEIGHT: 127 LBS | OXYGEN SATURATION: 94 % | BODY MASS INDEX: 23.98 KG/M2

## 2019-01-29 DIAGNOSIS — J20.9 ACUTE BRONCHITIS, UNSPECIFIED ORGANISM: Primary | ICD-10-CM

## 2019-01-29 DIAGNOSIS — R05.9 COUGH: ICD-10-CM

## 2019-01-29 DIAGNOSIS — R06.2 WHEEZING: ICD-10-CM

## 2019-01-29 DIAGNOSIS — Z72.0 TOBACCO ABUSE: ICD-10-CM

## 2019-01-29 DIAGNOSIS — H69.83 DYSFUNCTION OF BOTH EUSTACHIAN TUBES: ICD-10-CM

## 2019-01-29 DIAGNOSIS — R06.02 SHORTNESS OF BREATH: ICD-10-CM

## 2019-01-29 DIAGNOSIS — J44.9 CHRONIC OBSTRUCTIVE PULMONARY DISEASE, UNSPECIFIED COPD TYPE (HCC): ICD-10-CM

## 2019-01-29 DIAGNOSIS — R09.89 RHONCHI: ICD-10-CM

## 2019-01-29 PROCEDURE — 3023F SPIROM DOC REV: CPT | Performed by: FAMILY MEDICINE

## 2019-01-29 PROCEDURE — G8484 FLU IMMUNIZE NO ADMIN: HCPCS | Performed by: FAMILY MEDICINE

## 2019-01-29 PROCEDURE — G8427 DOCREV CUR MEDS BY ELIG CLIN: HCPCS | Performed by: FAMILY MEDICINE

## 2019-01-29 PROCEDURE — G8598 ASA/ANTIPLAT THER USED: HCPCS | Performed by: FAMILY MEDICINE

## 2019-01-29 PROCEDURE — 94640 AIRWAY INHALATION TREATMENT: CPT | Performed by: FAMILY MEDICINE

## 2019-01-29 PROCEDURE — 99214 OFFICE O/P EST MOD 30 MIN: CPT | Performed by: FAMILY MEDICINE

## 2019-01-29 PROCEDURE — G8926 SPIRO NO PERF OR DOC: HCPCS | Performed by: FAMILY MEDICINE

## 2019-01-29 PROCEDURE — 3017F COLORECTAL CA SCREEN DOC REV: CPT | Performed by: FAMILY MEDICINE

## 2019-01-29 PROCEDURE — G8420 CALC BMI NORM PARAMETERS: HCPCS | Performed by: FAMILY MEDICINE

## 2019-01-29 PROCEDURE — 4004F PT TOBACCO SCREEN RCVD TLK: CPT | Performed by: FAMILY MEDICINE

## 2019-01-29 RX ORDER — CEFUROXIME AXETIL 500 MG/1
500 TABLET ORAL 2 TIMES DAILY
Qty: 20 TABLET | Refills: 0 | Status: SHIPPED | OUTPATIENT
Start: 2019-01-29 | End: 2019-02-01 | Stop reason: ALTCHOICE

## 2019-01-29 RX ORDER — ALBUTEROL SULFATE 90 UG/1
2 AEROSOL, METERED RESPIRATORY (INHALATION) EVERY 6 HOURS PRN
Qty: 1 INHALER | Refills: 3 | Status: SHIPPED | OUTPATIENT
Start: 2019-01-29

## 2019-01-29 RX ORDER — FLUTICASONE FUROATE AND VILANTEROL 200; 25 UG/1; UG/1
1 POWDER RESPIRATORY (INHALATION) DAILY
Qty: 1 EACH | Refills: 3 | Status: SHIPPED | OUTPATIENT
Start: 2019-01-29

## 2019-01-29 RX ORDER — ALBUTEROL SULFATE 2.5 MG/3ML
2.5 SOLUTION RESPIRATORY (INHALATION) ONCE
Status: COMPLETED | OUTPATIENT
Start: 2019-01-29 | End: 2019-01-29

## 2019-01-29 RX ORDER — PREDNISONE 10 MG/1
TABLET ORAL
Qty: 20 TABLET | Refills: 0 | Status: SHIPPED | OUTPATIENT
Start: 2019-01-29 | End: 2019-02-08

## 2019-01-29 RX ADMIN — ALBUTEROL SULFATE 2.5 MG: 2.5 SOLUTION RESPIRATORY (INHALATION) at 10:56

## 2019-01-29 ASSESSMENT — ENCOUNTER SYMPTOMS
RHINORRHEA: 1
BACK PAIN: 1
SORE THROAT: 0
DIARRHEA: 0
NAUSEA: 0
FACIAL SWELLING: 0
PHOTOPHOBIA: 0
ABDOMINAL PAIN: 0
CONSTIPATION: 0
COUGH: 1
CHEST TIGHTNESS: 0
WHEEZING: 1
SINUS PAIN: 0
SWOLLEN GLANDS: 0
COLOR CHANGE: 0
STRIDOR: 0
SHORTNESS OF BREATH: 1
EYE REDNESS: 0
VOMITING: 1
EYE DISCHARGE: 0
CHOKING: 0
APNEA: 0
EYE PAIN: 0

## 2019-02-01 ENCOUNTER — OFFICE VISIT (OUTPATIENT)
Dept: PRIMARY CARE CLINIC | Age: 54
End: 2019-02-01
Payer: COMMERCIAL

## 2019-02-01 VITALS
WEIGHT: 127 LBS | HEIGHT: 61 IN | SYSTOLIC BLOOD PRESSURE: 140 MMHG | BODY MASS INDEX: 23.98 KG/M2 | DIASTOLIC BLOOD PRESSURE: 88 MMHG | HEART RATE: 66 BPM | OXYGEN SATURATION: 95 % | TEMPERATURE: 98.2 F

## 2019-02-01 DIAGNOSIS — R05.9 COUGH: ICD-10-CM

## 2019-02-01 DIAGNOSIS — R06.2 WHEEZING: ICD-10-CM

## 2019-02-01 DIAGNOSIS — Z72.0 TOBACCO ABUSE: ICD-10-CM

## 2019-02-01 DIAGNOSIS — J20.9 ACUTE BRONCHITIS, UNSPECIFIED ORGANISM: Primary | ICD-10-CM

## 2019-02-01 DIAGNOSIS — J44.9 CHRONIC OBSTRUCTIVE PULMONARY DISEASE, UNSPECIFIED COPD TYPE (HCC): ICD-10-CM

## 2019-02-01 PROCEDURE — G8926 SPIRO NO PERF OR DOC: HCPCS | Performed by: FAMILY MEDICINE

## 2019-02-01 PROCEDURE — 3023F SPIROM DOC REV: CPT | Performed by: FAMILY MEDICINE

## 2019-02-01 PROCEDURE — G8420 CALC BMI NORM PARAMETERS: HCPCS | Performed by: FAMILY MEDICINE

## 2019-02-01 PROCEDURE — 3017F COLORECTAL CA SCREEN DOC REV: CPT | Performed by: FAMILY MEDICINE

## 2019-02-01 PROCEDURE — G8427 DOCREV CUR MEDS BY ELIG CLIN: HCPCS | Performed by: FAMILY MEDICINE

## 2019-02-01 PROCEDURE — G8484 FLU IMMUNIZE NO ADMIN: HCPCS | Performed by: FAMILY MEDICINE

## 2019-02-01 PROCEDURE — 4004F PT TOBACCO SCREEN RCVD TLK: CPT | Performed by: FAMILY MEDICINE

## 2019-02-01 PROCEDURE — G8598 ASA/ANTIPLAT THER USED: HCPCS | Performed by: FAMILY MEDICINE

## 2019-02-01 PROCEDURE — 99214 OFFICE O/P EST MOD 30 MIN: CPT | Performed by: FAMILY MEDICINE

## 2019-02-01 RX ORDER — BUPROPION HYDROCHLORIDE 150 MG/1
150 TABLET ORAL EVERY MORNING
Qty: 7 TABLET | Refills: 3 | Status: SHIPPED | OUTPATIENT
Start: 2019-02-01 | End: 2019-02-08

## 2019-02-01 RX ORDER — LEVOFLOXACIN 500 MG/1
500 TABLET, FILM COATED ORAL DAILY
Qty: 10 TABLET | Refills: 0 | Status: SHIPPED | OUTPATIENT
Start: 2019-02-01 | End: 2019-02-11

## 2019-02-01 RX ORDER — BUPROPION HYDROCHLORIDE 300 MG/1
300 TABLET ORAL EVERY MORNING
Qty: 30 TABLET | Refills: 3 | Status: SHIPPED | OUTPATIENT
Start: 2019-02-01

## 2019-02-01 ASSESSMENT — ENCOUNTER SYMPTOMS
ABDOMINAL PAIN: 0
STRIDOR: 0
CONSTIPATION: 0
NAUSEA: 0
PHOTOPHOBIA: 0
WHEEZING: 1
EYE DISCHARGE: 0
EYE REDNESS: 0
APNEA: 0
COUGH: 1
FACIAL SWELLING: 0
COLOR CHANGE: 0
EYE PAIN: 0
HEMOPTYSIS: 0
RHINORRHEA: 1
SORE THROAT: 0
CHOKING: 0
SHORTNESS OF BREATH: 1
CHEST TIGHTNESS: 0
DIARRHEA: 0
HEARTBURN: 0

## 2019-02-25 ENCOUNTER — OFFICE VISIT (OUTPATIENT)
Dept: PRIMARY CARE CLINIC | Age: 54
End: 2019-02-25
Payer: COMMERCIAL

## 2019-02-25 VITALS
DIASTOLIC BLOOD PRESSURE: 76 MMHG | BODY MASS INDEX: 23.88 KG/M2 | HEIGHT: 61 IN | WEIGHT: 126.5 LBS | RESPIRATION RATE: 14 BRPM | HEART RATE: 66 BPM | SYSTOLIC BLOOD PRESSURE: 102 MMHG | OXYGEN SATURATION: 96 % | TEMPERATURE: 97.9 F

## 2019-02-25 DIAGNOSIS — J44.1 CHRONIC OBSTRUCTIVE PULMONARY DISEASE WITH ACUTE EXACERBATION (HCC): Primary | ICD-10-CM

## 2019-02-25 DIAGNOSIS — Z99.81 OXYGEN DEPENDENT: ICD-10-CM

## 2019-02-25 DIAGNOSIS — J20.9 ACUTE BRONCHITIS, UNSPECIFIED ORGANISM: ICD-10-CM

## 2019-02-25 DIAGNOSIS — F17.200 TOBACCO USE DISORDER: ICD-10-CM

## 2019-02-25 PROCEDURE — 3017F COLORECTAL CA SCREEN DOC REV: CPT | Performed by: FAMILY MEDICINE

## 2019-02-25 PROCEDURE — 99214 OFFICE O/P EST MOD 30 MIN: CPT | Performed by: FAMILY MEDICINE

## 2019-02-25 PROCEDURE — G8484 FLU IMMUNIZE NO ADMIN: HCPCS | Performed by: FAMILY MEDICINE

## 2019-02-25 PROCEDURE — G8420 CALC BMI NORM PARAMETERS: HCPCS | Performed by: FAMILY MEDICINE

## 2019-02-25 PROCEDURE — 4004F PT TOBACCO SCREEN RCVD TLK: CPT | Performed by: FAMILY MEDICINE

## 2019-02-25 PROCEDURE — G8427 DOCREV CUR MEDS BY ELIG CLIN: HCPCS | Performed by: FAMILY MEDICINE

## 2019-02-25 PROCEDURE — G8598 ASA/ANTIPLAT THER USED: HCPCS | Performed by: FAMILY MEDICINE

## 2019-02-25 PROCEDURE — 3023F SPIROM DOC REV: CPT | Performed by: FAMILY MEDICINE

## 2019-02-25 PROCEDURE — G8926 SPIRO NO PERF OR DOC: HCPCS | Performed by: FAMILY MEDICINE

## 2019-02-25 RX ORDER — PANTOPRAZOLE SODIUM 40 MG/1
TABLET, DELAYED RELEASE ORAL
Qty: 90 TABLET | Refills: 1 | Status: SHIPPED | OUTPATIENT
Start: 2019-02-25

## 2019-02-25 RX ORDER — SIMVASTATIN 20 MG
TABLET ORAL
Qty: 90 TABLET | Refills: 1 | Status: SHIPPED | OUTPATIENT
Start: 2019-02-25

## 2019-02-25 RX ORDER — LISINOPRIL AND HYDROCHLOROTHIAZIDE 20; 12.5 MG/1; MG/1
TABLET ORAL
Qty: 90 TABLET | Refills: 1 | Status: SHIPPED | OUTPATIENT
Start: 2019-02-25

## 2019-02-25 RX ORDER — CLINDAMYCIN HYDROCHLORIDE 300 MG/1
300 CAPSULE ORAL 3 TIMES DAILY
Qty: 30 CAPSULE | Refills: 1 | Status: SHIPPED | OUTPATIENT
Start: 2019-02-25 | End: 2019-03-07

## 2019-02-25 RX ORDER — PREDNISONE 10 MG/1
TABLET ORAL
Qty: 50 TABLET | Refills: 0 | Status: SHIPPED | OUTPATIENT
Start: 2019-02-25 | End: 2019-03-07

## 2019-02-25 ASSESSMENT — ENCOUNTER SYMPTOMS
TROUBLE SWALLOWING: 0
STRIDOR: 0
CONSTIPATION: 0
FACIAL SWELLING: 0
SPUTUM PRODUCTION: 1
COUGH: 1
ABDOMINAL PAIN: 0
RHINORRHEA: 0
EYE REDNESS: 0
COLOR CHANGE: 0
CHOKING: 0
SHORTNESS OF BREATH: 1
HEARTBURN: 0
CHEST TIGHTNESS: 1
EYE DISCHARGE: 0
DIARRHEA: 0
NAUSEA: 0
SORE THROAT: 0
WHEEZING: 1
PHOTOPHOBIA: 0
APNEA: 0
EYE PAIN: 0

## 2019-02-25 ASSESSMENT — COPD QUESTIONNAIRES: COPD: 1

## 2019-03-07 ENCOUNTER — TELEPHONE (OUTPATIENT)
Dept: ADMINISTRATIVE | Age: 54
End: 2019-03-07

## 2022-09-07 ENCOUNTER — HOSPITAL ENCOUNTER (OUTPATIENT)
Dept: MRI IMAGING | Age: 57
Discharge: HOME | End: 2022-09-09
Payer: COMMERCIAL

## 2022-09-07 DIAGNOSIS — S83.249A TEAR OF MEDIAL MENISCUS OF KNEE, UNSPECIFIED LATERALITY, UNSPECIFIED TEAR TYPE, UNSPECIFIED WHETHER OLD OR CURRENT TEAR, INITIAL ENCOUNTER: ICD-10-CM

## 2022-09-07 PROCEDURE — 73721 MRI JNT OF LWR EXTRE W/O DYE: CPT

## 2023-03-05 DIAGNOSIS — E78.5 DYSLIPIDEMIA: Primary | ICD-10-CM

## 2023-03-06 RX ORDER — ROSUVASTATIN CALCIUM 20 MG/1
TABLET, COATED ORAL
Qty: 30 TABLET | Refills: 0 | Status: SHIPPED | OUTPATIENT
Start: 2023-03-06 | End: 2024-02-29

## 2023-03-07 DIAGNOSIS — K21.9 GASTROESOPHAGEAL REFLUX DISEASE, UNSPECIFIED WHETHER ESOPHAGITIS PRESENT: ICD-10-CM

## 2023-03-07 DIAGNOSIS — I10 PRIMARY HYPERTENSION: Primary | ICD-10-CM

## 2023-03-08 RX ORDER — LISINOPRIL AND HYDROCHLOROTHIAZIDE 12.5; 2 MG/1; MG/1
TABLET ORAL
Qty: 30 TABLET | Refills: 5 | Status: SHIPPED | OUTPATIENT
Start: 2023-03-08 | End: 2023-09-01

## 2023-03-08 RX ORDER — ATENOLOL 50 MG/1
TABLET ORAL
Qty: 30 TABLET | Refills: 5 | Status: SHIPPED | OUTPATIENT
Start: 2023-03-08 | End: 2024-02-29

## 2023-03-08 RX ORDER — PANTOPRAZOLE SODIUM 40 MG/1
TABLET, DELAYED RELEASE ORAL
Qty: 60 TABLET | Refills: 5 | Status: SHIPPED | OUTPATIENT
Start: 2023-03-08 | End: 2024-04-08

## 2023-05-01 ENCOUNTER — TELEMEDICINE (OUTPATIENT)
Dept: PRIMARY CARE | Facility: CLINIC | Age: 58
End: 2023-05-01
Payer: COMMERCIAL

## 2023-05-01 DIAGNOSIS — J44.1 COPD EXACERBATION (MULTI): Primary | ICD-10-CM

## 2023-05-01 PROCEDURE — 99443 PR PHYS/QHP TELEPHONE EVALUATION 21-30 MIN: CPT | Performed by: NURSE PRACTITIONER

## 2023-05-01 RX ORDER — ASPIRIN 81 MG/1
1 TABLET ORAL DAILY
COMMUNITY
Start: 2020-12-15

## 2023-05-01 RX ORDER — PREDNISONE 10 MG/1
TABLET ORAL
Qty: 30 TABLET | Refills: 0 | Status: SHIPPED | OUTPATIENT
Start: 2023-05-01 | End: 2023-05-11

## 2023-05-01 RX ORDER — IPRATROPIUM BROMIDE AND ALBUTEROL SULFATE 2.5; .5 MG/3ML; MG/3ML
3 SOLUTION RESPIRATORY (INHALATION) EVERY 4 HOURS PRN
Qty: 180 ML | Refills: 2 | Status: SHIPPED | OUTPATIENT
Start: 2023-05-01

## 2023-05-01 RX ORDER — BUDESONIDE, GLYCOPYRROLATE, AND FORMOTEROL FUMARATE 160; 9; 4.8 UG/1; UG/1; UG/1
2 AEROSOL, METERED RESPIRATORY (INHALATION) 2 TIMES DAILY
COMMUNITY
Start: 2020-12-15 | End: 2023-07-06

## 2023-05-01 RX ORDER — AZITHROMYCIN 250 MG/1
TABLET, FILM COATED ORAL
Qty: 6 TABLET | Refills: 0 | Status: SHIPPED | OUTPATIENT
Start: 2023-05-01 | End: 2023-05-06

## 2023-05-01 RX ORDER — IPRATROPIUM BROMIDE AND ALBUTEROL SULFATE 2.5; .5 MG/3ML; MG/3ML
3 SOLUTION RESPIRATORY (INHALATION) EVERY 4 HOURS PRN
COMMUNITY
Start: 2021-10-09 | End: 2023-05-01 | Stop reason: SDUPTHER

## 2023-05-01 ASSESSMENT — ENCOUNTER SYMPTOMS
WHEEZING: 1
CHILLS: 1
COUGH: 1
HEADACHES: 1
SHORTNESS OF BREATH: 1

## 2023-05-01 NOTE — PATIENT INSTRUCTIONS
Patient presents today for evaluation of COPD Exacerbation     Patient complains of phlegm and wheezing -   Start zithromax   Start prednisone   Instructed for patient to start mucinex-    Duoneb nebulizer solution to use PRN     all questions answered  follow up in office if signs or symptoms are worsening, not improving  or  please schedule follow up with PCP   if shortness of breath and or chest pain seek emergency department   24 hour hotline telephone numbers  Suicide or mental health mobile crisis help line [phone]  Child Abuse or neglect [phone]  Elder Abuse or neglect [phone]  Rape Crisis [phone]  Domestic Violence [phone]  Narcotics Anonymous [phone]

## 2023-05-01 NOTE — PROGRESS NOTES
Subjective   Patient ID: Karol Rodas is a 58 y.o. female who presents for Cough.    HPI Patient cough phlegm clear since 04/29/2023, wheezing, SOB. Patient reports today the phlegm is better. Patient reports she is still coughing. Pt reports this symptoms have happened 2 different occassions.  Pt had goosebumps patient is a current smoker. Patent has a nebulizer but needs new solution for it.   Virtual or Telephone Consent    A telephone visit (audio only) between the patient (at the originating site) and the provider (at the distant site) was utilized to provide this telehealth service.   Verbal consent was requested and obtained from Karol Rodas on this date, 05/01/23 for a telehealth visit.      Review of Systems   Respiratory:  Positive for cough and wheezing.        Objective   There were no vitals taken for this visit.    Physical Exam  Virtual visit today patient was not seen in office a virtual encounter was completed for this patient      Assessment/Plan   Problem List Items Addressed This Visit    None  Visit Diagnoses       COPD exacerbation (CMS/[facility])    -  Primary    Relevant Medications    ipratropium-albuteroL (Duo-Neb) 0.5-2.5 mg/3 mL nebulizer solution    azithromycin (Zithromax) 250 mg tablet    predniSONE (Deltasone) 10 mg tablet

## 2023-05-11 DIAGNOSIS — B37.0 THRUSH: ICD-10-CM

## 2023-05-11 RX ORDER — NYSTATIN 100000 [USP'U]/ML
5 SUSPENSION ORAL 4 TIMES DAILY
Qty: 280 ML | Refills: 0 | Status: SHIPPED | OUTPATIENT
Start: 2023-05-11 | End: 2023-06-05 | Stop reason: SDUPTHER

## 2023-05-11 NOTE — TELEPHONE ENCOUNTER
PT CALLED IN AND STATED SHE HAS THRUSH, SHE WOULD LIKE A MEDICATION SCRIPT SENT OVER TO THE PHARMACY.    MOHAMUD HAWK

## 2023-06-05 ENCOUNTER — OFFICE VISIT (OUTPATIENT)
Dept: PRIMARY CARE | Facility: CLINIC | Age: 58
End: 2023-06-05
Payer: COMMERCIAL

## 2023-06-05 VITALS
OXYGEN SATURATION: 98 % | SYSTOLIC BLOOD PRESSURE: 110 MMHG | RESPIRATION RATE: 14 BRPM | HEART RATE: 77 BPM | WEIGHT: 120 LBS | HEIGHT: 61 IN | BODY MASS INDEX: 22.66 KG/M2 | DIASTOLIC BLOOD PRESSURE: 62 MMHG

## 2023-06-05 DIAGNOSIS — J43.9 PULMONARY EMPHYSEMA, UNSPECIFIED EMPHYSEMA TYPE (MULTI): Primary | ICD-10-CM

## 2023-06-05 DIAGNOSIS — I10 PRIMARY HYPERTENSION: ICD-10-CM

## 2023-06-05 DIAGNOSIS — B37.0 THRUSH: ICD-10-CM

## 2023-06-05 DIAGNOSIS — Z72.0 TOBACCO ABUSE: ICD-10-CM

## 2023-06-05 DIAGNOSIS — H69.90 DYSFUNCTION OF EUSTACHIAN TUBE, UNSPECIFIED LATERALITY: ICD-10-CM

## 2023-06-05 PROBLEM — R73.03 PREDIABETES: Status: ACTIVE | Noted: 2023-06-05

## 2023-06-05 PROBLEM — I25.10 CORONARY ATHEROSCLEROSIS: Status: ACTIVE | Noted: 2018-07-20

## 2023-06-05 PROBLEM — R13.10 DYSPHAGIA: Status: ACTIVE | Noted: 2023-06-05

## 2023-06-05 PROBLEM — E87.6 HYPOKALEMIA: Status: ACTIVE | Noted: 2023-06-05

## 2023-06-05 PROBLEM — K21.9 GERD (GASTROESOPHAGEAL REFLUX DISEASE): Status: ACTIVE | Noted: 2023-06-05

## 2023-06-05 PROBLEM — F10.10 ETOH ABUSE: Status: ACTIVE | Noted: 2023-06-05

## 2023-06-05 PROCEDURE — 99214 OFFICE O/P EST MOD 30 MIN: CPT | Performed by: FAMILY MEDICINE

## 2023-06-05 RX ORDER — NYSTATIN 100000 [USP'U]/ML
5 SUSPENSION ORAL 4 TIMES DAILY
Qty: 280 ML | Refills: 0 | Status: SHIPPED | OUTPATIENT
Start: 2023-06-05

## 2023-06-05 RX ORDER — TERBINAFINE HYDROCHLORIDE 250 MG/1
250 TABLET ORAL DAILY
Qty: 14 TABLET | Refills: 0 | Status: SHIPPED | OUTPATIENT
Start: 2023-06-05 | End: 2023-06-19

## 2023-06-05 RX ORDER — AZELASTINE 1 MG/ML
1 SPRAY, METERED NASAL 2 TIMES DAILY
Qty: 30 ML | Refills: 12 | Status: SHIPPED | OUTPATIENT
Start: 2023-06-05 | End: 2024-05-16 | Stop reason: WASHOUT

## 2023-06-05 ASSESSMENT — ENCOUNTER SYMPTOMS
GASTROINTESTINAL NEGATIVE: 1
EYES NEGATIVE: 1
ALLERGIC/IMMUNOLOGIC NEGATIVE: 1
CONSTITUTIONAL NEGATIVE: 1
HEMATOLOGIC/LYMPHATIC NEGATIVE: 1
CARDIOVASCULAR NEGATIVE: 1
PSYCHIATRIC NEGATIVE: 1
ENDOCRINE NEGATIVE: 1
NEUROLOGICAL NEGATIVE: 1
MUSCULOSKELETAL NEGATIVE: 1
RESPIRATORY NEGATIVE: 1

## 2023-06-05 NOTE — PROGRESS NOTES
"Subjective   Patient ID: Karol Rodas is a 58 y.o. female who presents for ear fullness     Ear Fullness   There is pain in both ears. This is a new problem. The current episode started in the past 7 days. The problem occurs constantly. The problem has been unchanged. There has been no fever.      Thrush pt states the back of her throat is white and would like to discuss this today.    Review of Systems   Constitutional: Negative.    HENT: Negative.     Eyes: Negative.    Respiratory: Negative.     Cardiovascular: Negative.    Gastrointestinal: Negative.    Endocrine: Negative.    Genitourinary: Negative.    Musculoskeletal: Negative.    Skin: Negative.    Allergic/Immunologic: Negative.    Neurological: Negative.    Hematological: Negative.    Psychiatric/Behavioral: Negative.         Objective   /62 (BP Location: Left arm, Patient Position: Sitting, BP Cuff Size: Adult)   Pulse 77   Resp 14   Ht 1.549 m (5' 1\")   Wt 54.4 kg (120 lb)   SpO2 98%   BMI 22.67 kg/m²     Physical Exam CONSTITUTIONAL- NAD, Pt is A & O x3, Vital signs reviewed per chart.  General Appearance- normal , good hygiene,talks easily  EYES-PERRLA conjunctiva and lids- normal  EARS/NOSE-TM's normal, head normocephalic and atraumatic, naso pharynx-no nasal discharge, no trismus,  oropharynx- normal without exudate  NECK- supple, FROM, without mass  thyroid- NT, normal size, no nodule noted  LYMPH- WNL  CV- RRR without murmur, gallop, or other abnormality.  PULM- CTA bilaterally  Respiratory effort- normal respiratory effort , no retractions or nasal flaring  ABDOMEN- normoactive BS's , soft , NT, no hepatosplenomegaly palpated, or masses. No pulsatile masses noted  extremities no edema,NT  SKIN- no abnormal skin lesions on inspection or palpation  neuro- no focal deficits  PSYCH- pleasant, normal judgement and insight     Assessment/Plan   Problem List Items Addressed This Visit       Chronic obstructive pulmonary disease " (CMS/[facility]) - Primary    HTN (hypertension)    Thrush    Relevant Medications    terbinafine (LamISIL) 250 mg tablet    nystatin (Mycostatin) 100,000 unit/mL suspension    Tobacco abuse     Other Visit Diagnoses       Dysfunction of Eustachian tube, unspecified laterality        Relevant Medications    azelastine (Astelin) 137 mcg (0.1 %) nasal spray             Scribe Attestation  By signing my name below, I, Florin Posada DO  , Scribe   attest that this documentation has been prepared under the direction and in the presence of Florin Posada DO.

## 2023-06-08 ENCOUNTER — APPOINTMENT (OUTPATIENT)
Dept: PRIMARY CARE | Facility: CLINIC | Age: 58
End: 2023-06-08
Payer: COMMERCIAL

## 2023-07-03 DIAGNOSIS — J44.9 CHRONIC OBSTRUCTIVE PULMONARY DISEASE, UNSPECIFIED (MULTI): ICD-10-CM

## 2023-07-06 RX ORDER — BUDESONIDE, GLYCOPYRROLATE, AND FORMOTEROL FUMARATE 160; 9; 4.8 UG/1; UG/1; UG/1
AEROSOL, METERED RESPIRATORY (INHALATION)
Qty: 10.7 G | Refills: 3 | Status: SHIPPED | OUTPATIENT
Start: 2023-07-06 | End: 2023-11-01

## 2023-09-01 DIAGNOSIS — I10 PRIMARY HYPERTENSION: ICD-10-CM

## 2023-09-01 RX ORDER — LISINOPRIL AND HYDROCHLOROTHIAZIDE 12.5; 2 MG/1; MG/1
TABLET ORAL
Qty: 30 TABLET | Refills: 0 | Status: SHIPPED | OUTPATIENT
Start: 2023-09-01 | End: 2023-09-05

## 2023-09-02 DIAGNOSIS — I10 PRIMARY HYPERTENSION: ICD-10-CM

## 2023-09-05 RX ORDER — LISINOPRIL AND HYDROCHLOROTHIAZIDE 12.5; 2 MG/1; MG/1
TABLET ORAL
Qty: 30 TABLET | Refills: 5 | Status: SHIPPED | OUTPATIENT
Start: 2023-09-05 | End: 2024-03-26

## 2023-10-18 ENCOUNTER — TELEPHONE (OUTPATIENT)
Dept: PRIMARY CARE | Facility: CLINIC | Age: 58
End: 2023-10-18
Payer: COMMERCIAL

## 2023-10-18 NOTE — TELEPHONE ENCOUNTER
Dr marquis pt  Is currently being treated for thrush however non of the mediation she has been given is working

## 2023-10-20 ENCOUNTER — OFFICE VISIT (OUTPATIENT)
Dept: PRIMARY CARE | Facility: CLINIC | Age: 58
End: 2023-10-20
Payer: COMMERCIAL

## 2023-10-20 VITALS
OXYGEN SATURATION: 94 % | SYSTOLIC BLOOD PRESSURE: 130 MMHG | DIASTOLIC BLOOD PRESSURE: 74 MMHG | BODY MASS INDEX: 23.45 KG/M2 | WEIGHT: 124.2 LBS | HEART RATE: 74 BPM | HEIGHT: 61 IN

## 2023-10-20 DIAGNOSIS — J43.9 PULMONARY EMPHYSEMA, UNSPECIFIED EMPHYSEMA TYPE (MULTI): ICD-10-CM

## 2023-10-20 DIAGNOSIS — J39.2 PHARYNGEAL LESION: Primary | ICD-10-CM

## 2023-10-20 DIAGNOSIS — I10 PRIMARY HYPERTENSION: ICD-10-CM

## 2023-10-20 DIAGNOSIS — M70.61 TROCHANTERIC BURSITIS OF RIGHT HIP: ICD-10-CM

## 2023-10-20 DIAGNOSIS — B37.0 THRUSH, ORAL: ICD-10-CM

## 2023-10-20 PROBLEM — E78.00 PURE HYPERCHOLESTEROLEMIA: Status: ACTIVE | Noted: 2018-07-20

## 2023-10-20 PROCEDURE — 99214 OFFICE O/P EST MOD 30 MIN: CPT | Performed by: FAMILY MEDICINE

## 2023-10-20 RX ORDER — MELOXICAM 15 MG/1
15 TABLET ORAL DAILY
Qty: 30 TABLET | Refills: 3 | Status: SHIPPED | OUTPATIENT
Start: 2023-10-20 | End: 2024-05-16 | Stop reason: WASHOUT

## 2023-10-20 RX ORDER — CLOTRIMAZOLE 10 MG/1
10 LOZENGE ORAL; TOPICAL
Qty: 50 TABLET | Refills: 0 | Status: SHIPPED | OUTPATIENT
Start: 2023-10-20 | End: 2023-10-30

## 2023-10-20 NOTE — PROGRESS NOTES
Subjective   Patient ID: Karol Rodas is a 58 y.o. female who presents for Thrush (Follow up).    HPI   Patient is here for a follow upon her thrush, states she used the nystatin mouth wash and it help a little bit, states she is having pain in her mouth due to the thrush and would like to try a different medication.     Patient is having right hip pain for the past couple of weeks. States she did not injure it that she is aware of, patient states it is intermittent as she is walking she will have a sharp pain going through her hip.     Denies any other concerns at this time.     Review of Systems  12 Systems have been reviewed as follows.  Constitutional: Fever, weight gain, weight loss, appetite change, night sweats, fatigue, chills.  Eyes : blurry, double vision, vision, loss, tearing, redness, pain, sensitivity to light, glaucoma.  Ears: nose, mouth, and throat: Hearing loss, ringing in the ears, ear pain, nasal congestion, nasal drainage, nosebleeds, mouth, throat, irritation tooth problem.  Cardiovascular: chest pain, pressure, heart racing, palpitations, sweating, leg swelling, high or low blood pressure  Pulmonary: Cough, yellow or green sputum, blood and sputum, shortness of breath, wheezing  Gastrointestinal: Nausea, vomiting, diarrhea, constipation, pain, blood in stool, or vomitus, heartburn, difficulty swallowing  Genitourinary: incontinence, abnormal bleeding, abnormal discharge, urinary frequency, urinary hesitancy, pain, impotence sexual problem, infection, urinary retention  Musculoskeletal: Pain, stiffness, joint, redness or warmth, arthritis, back pain, weakness, muscle wasting, sprain or fracture  Neuro: Weight weakness, dizziness, change in voice, change in taste change in vision, change in hearing, loss, or change of sensation, trouble walking, balance problems coordination problems, shaking, speech problem  Endocrine: cold or heat intolerance, blood sugar problem, weight gain or loss  "missed periods hot flashes, sweats, change in body hair, change in libido, increased thirst, increased urination  Heme/lymph: Swelling, bleeding, problem anemia, bruising, enlarged lymph nodes  Allergic/immunologic: H. plus nasal drip, watery itchy eyes, nasal drainage, immunosuppressed  The above were reviewed and noted negative except as noted in HPI and Problem List.    Objective   /74   Pulse 74   Ht 1.549 m (5' 1\")   Wt 56.3 kg (124 lb 3.2 oz)   SpO2 94%   BMI 23.47 kg/m²     Physical Exam  CONSTITUTIONAL- NAD, Pt is A & O x3, Vital signs reviewed per chart.  General Appearance- normal , good hygiene,talks easily  EYES-PERRLA conjunctiva and lids- normal  EARS/NOSE-TM's normal, head normocephalic and atraumatic, naso pharynx-no nasal discharge, no trismus,  oropharynx- normal without exudate  NECK- supple, FROM, without mass  thyroid- NT, normal size, no nodule noted  LYMPH- WNL  CV- RRR without murmur, gallop, or other abnormality.  PULM- CTA bilaterally  Respiratory effort- normal respiratory effort , no retractions or nasal flaring  ABDOMEN- normoactive BS's , soft , NT, no hepatosplenomegaly palpated, or masses. No pulsatile masses noted  extremities no edema,NT  SKIN- no abnormal skin lesions on inspection or palpation  neuro- no focal deficits  PSYCH- pleasant, normal judgement and insight    Assessment/Plan   Problem List Items Addressed This Visit             ICD-10-CM    Chronic obstructive pulmonary disease (CMS/HCC) J44.9    HTN (hypertension) I10     Other Visit Diagnoses         Codes    Pharyngeal lesion    -  Primary J39.2    Relevant Medications    clotrimazole (Mycelex) 10 mg june    Other Relevant Orders    Referral to ENT    Trochanteric bursitis of right hip     M70.61    Relevant Medications    meloxicam (Mobic) 15 mg tablet    Thrush, oral     B37.0    Relevant Medications    clotrimazole (Mycelex) 10 mg june    Other Relevant Orders    Referral to ENT          Scribe " Attestation  By signing my name below, I, TORIBIO Jara Scribe   attest that this documentation has been prepared under the direction and in the presence of Florin Posada DO.    Provider Attestation - Scribe documentation    All medical record entries made by the Scribe were at my direction and personally dictated by me. I have reviewed the chart and agree that the record accurately reflects my personal performance of the history, physical exam, discussion and plan.

## 2023-11-01 DIAGNOSIS — J44.9 CHRONIC OBSTRUCTIVE PULMONARY DISEASE, UNSPECIFIED (MULTI): ICD-10-CM

## 2023-11-01 RX ORDER — BUDESONIDE, GLYCOPYRROLATE, AND FORMOTEROL FUMARATE 160; 9; 4.8 UG/1; UG/1; UG/1
AEROSOL, METERED RESPIRATORY (INHALATION)
Qty: 10.7 G | Refills: 3 | Status: SHIPPED | OUTPATIENT
Start: 2023-11-01 | End: 2024-03-02

## 2023-11-30 DIAGNOSIS — B37.0 THRUSH, ORAL: ICD-10-CM

## 2023-11-30 RX ORDER — CLOTRIMAZOLE 10 MG/1
10 LOZENGE ORAL; TOPICAL
Qty: 50 TABLET | Refills: 0 | Status: SHIPPED | OUTPATIENT
Start: 2023-11-30 | End: 2024-05-07 | Stop reason: SDUPTHER

## 2023-11-30 NOTE — TELEPHONE ENCOUNTER
Dr. Posada patient    Patient says that [place] is going [place] the [place] and would like to know if she can have a refill for clotrimazole (Mycelex) 10 mg. Her thrush has cleared up, but would like to have the medication just in case it comes back.    Pharmacy: [facility] in [place] on [address]

## 2023-12-19 ENCOUNTER — OFFICE VISIT (OUTPATIENT)
Dept: PRIMARY CARE | Facility: CLINIC | Age: 58
End: 2023-12-19
Payer: COMMERCIAL

## 2023-12-19 ENCOUNTER — ANCILLARY PROCEDURE (OUTPATIENT)
Dept: RADIOLOGY | Facility: CLINIC | Age: 58
End: 2023-12-19
Payer: COMMERCIAL

## 2023-12-19 VITALS
OXYGEN SATURATION: 96 % | HEART RATE: 76 BPM | WEIGHT: 124.2 LBS | DIASTOLIC BLOOD PRESSURE: 90 MMHG | SYSTOLIC BLOOD PRESSURE: 140 MMHG | BODY MASS INDEX: 23.45 KG/M2 | RESPIRATION RATE: 16 BRPM | HEIGHT: 61 IN

## 2023-12-19 DIAGNOSIS — R73.03 PREDIABETES: ICD-10-CM

## 2023-12-19 DIAGNOSIS — I10 PRIMARY HYPERTENSION: ICD-10-CM

## 2023-12-19 DIAGNOSIS — J44.1 COPD EXACERBATION (MULTI): ICD-10-CM

## 2023-12-19 DIAGNOSIS — M65.9 SYNOVITIS: ICD-10-CM

## 2023-12-19 DIAGNOSIS — M79.642 LEFT HAND PAIN: ICD-10-CM

## 2023-12-19 DIAGNOSIS — J43.9 PULMONARY EMPHYSEMA, UNSPECIFIED EMPHYSEMA TYPE (MULTI): ICD-10-CM

## 2023-12-19 DIAGNOSIS — Z72.0 TOBACCO ABUSE: ICD-10-CM

## 2023-12-19 DIAGNOSIS — E78.5 DYSLIPIDEMIA: ICD-10-CM

## 2023-12-19 PROCEDURE — 99214 OFFICE O/P EST MOD 30 MIN: CPT | Performed by: FAMILY MEDICINE

## 2023-12-19 PROCEDURE — 73130 X-RAY EXAM OF HAND: CPT | Mod: LEFT SIDE | Performed by: RADIOLOGY

## 2023-12-19 PROCEDURE — 73130 X-RAY EXAM OF HAND: CPT | Mod: LT

## 2023-12-19 RX ORDER — MELOXICAM 15 MG/1
15 TABLET ORAL DAILY
Qty: 30 TABLET | Refills: 11 | Status: SHIPPED | OUTPATIENT
Start: 2023-12-19 | End: 2024-12-18

## 2023-12-19 NOTE — PROGRESS NOTES
Subjective   Patient ID: Karol Rodas is a 58 y.o. female who presents for Hand Pain.    Patient presents in office with a complaint of hand pain and swelling. Patient admits while removing snow from her vehicle she flicking her middle finger she felt a severe pain which instantly began swelling. Patient has been icing her hand.     Review of Systems  12 Systems have been reviewed as follows.  Constitutional: Fever, weight gain, weight loss, appetite change, night sweats, fatigue, chills.  Eyes : blurry, double vision, vision, loss, tearing, redness, pain, sensitivity to light, glaucoma.  Ears: nose, mouth, and throat: Hearing loss, ringing in the ears, ear pain, nasal congestion, nasal drainage, nosebleeds, mouth, throat, irritation tooth problem.  Cardiovascular: chest pain, pressure, heart racing, palpitations, sweating, leg swelling, high or low blood pressure  Pulmonary: Cough, yellow or green sputum, blood and sputum, shortness of breath, wheezing  Gastrointestinal: Nausea, vomiting, diarrhea, constipation, pain, blood in stool, or vomitus, heartburn, difficulty swallowing  Genitourinary: incontinence, abnormal bleeding, abnormal discharge, urinary frequency, urinary hesitancy, pain, impotence sexual problem, infection, urinary retention  Musculoskeletal: Pain, stiffness, joint, redness or warmth, arthritis, back pain, weakness, muscle wasting, sprain or fracture  Neuro: Weight weakness, dizziness, change in voice, change in taste change in vision, change in hearing, loss, or change of sensation, trouble walking, balance problems coordination problems, shaking, speech problem  Endocrine: cold or heat intolerance, blood sugar problem, weight gain or loss missed periods hot flashes, sweats, change in body hair, change in libido, increased thirst, increased urination  Heme/lymph: Swelling, bleeding, problem anemia, bruising, enlarged lymph nodes  Allergic/immunologic: H. plus nasal drip, watery itchy  "eyes, nasal drainage, immunosuppressed  The above were reviewed and noted negative except as noted in HPI and Problem List.    Objective   /90 (BP Location: Right arm, Patient Position: Sitting, BP Cuff Size: Adult)   Pulse 76   Resp 16   Ht 1.549 m (5' 1\")   Wt 56.3 kg (124 lb 3.2 oz)   SpO2 96%   BMI 23.47 kg/m²     Physical Exam  CONSTITUTIONAL- NAD, Pt is A & O x3, Vital signs reviewed per chart.  General Appearance- normal , good hygiene,talks easily  EYES-PERRLA conjunctiva and lids- normal  EARS/NOSE-TM's normal, head normocephalic and atraumatic, naso pharynx-no nasal discharge, no trismus,  oropharynx- normal without exudate  NECK- supple, FROM, without mass  thyroid- NT, normal size, no nodule noted  LYMPH- WNL  CV- RRR without murmur, gallop, or other abnormality.  PULM- CTA bilaterally  Respiratory effort- normal respiratory effort , no retractions or nasal flaring  ABDOMEN- normoactive BS's , soft , NT, no hepatosplenomegaly palpated, or masses. No pulsatile masses noted  extremities no edema,NT  SKIN- no abnormal skin lesions on inspection or palpation  neuro- no focal deficits  PSYCH- pleasant, normal judgement and insight    Assessment/Plan   Problem List Items Addressed This Visit             ICD-10-CM    Chronic obstructive pulmonary disease (CMS/HCC) J44.9    Dyslipidemia E78.5    Relevant Orders    Lipid Panel    Thyroxine, Free    Thyroxine, Total    Thyroid Stimulating Hormone    HTN (hypertension) I10    Prediabetes R73.03    Relevant Orders    CBC and Auto Differential    Comprehensive Metabolic Panel    Tobacco abuse Z72.0     Other Visit Diagnoses         Codes    COPD exacerbation (CMS/HCC)     J44.1    Left hand pain     M79.642    Relevant Medications    meloxicam (Mobic) 15 mg tablet    Other Relevant Orders    XR hand left 3+ views    Synovitis     M65.9    Relevant Medications    meloxicam (Mobic) 15 mg tablet          Scribe Attestation  By signing my name below, I, " TORIBIO Jara , Scribe   attest that this documentation has been prepared under the direction and in the presence of Florin Posada DO.    Provider Attestation - Scribe documentation    All medical record entries made by the Scribe were at my direction and personally dictated by me. I have reviewed the chart and agree that the record accurately reflects my personal performance of the history, physical exam, discussion and plan.

## 2023-12-29 ENCOUNTER — APPOINTMENT (OUTPATIENT)
Dept: OTOLARYNGOLOGY | Facility: CLINIC | Age: 58
End: 2023-12-29
Payer: COMMERCIAL

## 2024-01-05 ENCOUNTER — OFFICE VISIT (OUTPATIENT)
Dept: PRIMARY CARE | Facility: CLINIC | Age: 59
End: 2024-01-05
Payer: COMMERCIAL

## 2024-01-05 VITALS
HEIGHT: 61 IN | BODY MASS INDEX: 23.49 KG/M2 | OXYGEN SATURATION: 97 % | SYSTOLIC BLOOD PRESSURE: 130 MMHG | DIASTOLIC BLOOD PRESSURE: 64 MMHG | WEIGHT: 124.4 LBS | HEART RATE: 67 BPM

## 2024-01-05 DIAGNOSIS — M77.9 TENDONITIS: ICD-10-CM

## 2024-01-05 DIAGNOSIS — L72.0 EIC (EPIDERMAL INCLUSION CYST): Primary | ICD-10-CM

## 2024-01-05 DIAGNOSIS — J44.1 COPD EXACERBATION (MULTI): ICD-10-CM

## 2024-01-05 DIAGNOSIS — Z12.31 ENCOUNTER FOR SCREENING MAMMOGRAM FOR MALIGNANT NEOPLASM OF BREAST: ICD-10-CM

## 2024-01-05 PROCEDURE — 99214 OFFICE O/P EST MOD 30 MIN: CPT | Performed by: FAMILY MEDICINE

## 2024-01-05 NOTE — PROGRESS NOTES
Subjective   Patient ID: Karol Rodas is a 58 y.o. female who presents for Hand Pain (Follow up )    HPI   Patient is here for a follow upon her left hand pain. Since last visit she was started on Mobic and is currently feeling the pain is better and feels the swelling has went down. Patient states she would like to give it more time before taking any further intervention as she feels better than onset and feels she needs time. Patient states she can now make a fist.     Denies any other concerns at this time.     Review of Systems  12 Systems have been reviewed as follows.  Constitutional: Fever, weight gain, weight loss, appetite change, night sweats, fatigue, chills.  Eyes : blurry, double vision, vision, loss, tearing, redness, pain, sensitivity to light, glaucoma.  Ears: nose, mouth, and throat: Hearing loss, ringing in the ears, ear pain, nasal congestion, nasal drainage, nosebleeds, mouth, throat, irritation tooth problem.  Cardiovascular: chest pain, pressure, heart racing, palpitations, sweating, leg swelling, high or low blood pressure  Pulmonary: Cough, yellow or green sputum, blood and sputum, shortness of breath, wheezing  Gastrointestinal: Nausea, vomiting, diarrhea, constipation, pain, blood in stool, or vomitus, heartburn, difficulty swallowing  Genitourinary: incontinence, abnormal bleeding, abnormal discharge, urinary frequency, urinary hesitancy, pain, impotence sexual problem, infection, urinary retention  Musculoskeletal: Pain, stiffness, joint, redness or warmth, arthritis, back pain, weakness, muscle wasting, sprain or fracture  Neuro: Weight weakness, dizziness, change in voice, change in taste change in vision, change in hearing, loss, or change of sensation, trouble walking, balance problems coordination problems, shaking, speech problem  Endocrine: cold or heat intolerance, blood sugar problem, weight gain or loss missed periods hot flashes, sweats, change in body hair, change in  "libido, increased thirst, increased urination  Heme/lymph: Swelling, bleeding, problem anemia, bruising, enlarged lymph nodes  Allergic/immunologic: H. plus nasal drip, watery itchy eyes, nasal drainage, immunosuppressed  The above were reviewed and noted negative except as noted in HPI and Problem List.    Objective   /64   Pulse 67   Ht 1.549 m (5' 1\")   Wt 56.4 kg (124 lb 6.4 oz)   SpO2 97%   BMI 23.51 kg/m²     Physical Exam  CONSTITUTIONAL- NAD, Pt is A & O x3, Vital signs reviewed per chart.  General Appearance- normal , good hygiene,talks easily  EYES-PERRLA conjunctiva and lids- normal  EARS/NOSE-TM's normal, head normocephalic and atraumatic, naso pharynx-no nasal discharge, no trismus,  oropharynx- normal without exudate  NECK- supple, FROM, without mass  thyroid- NT, normal size, no nodule noted  LYMPH- WNL  CV- RRR without murmur, gallop, or other abnormality.  PULM- CTA bilaterally  Respiratory effort- normal respiratory effort , no retractions or nasal flaring  ABDOMEN- normoactive BS's , soft , NT, no hepatosplenomegaly palpated, or masses. No pulsatile masses noted  extremities no edema,NT  SKIN- no abnormal skin lesions on inspection or palpation  neuro- no focal deficits  PSYCH- pleasant, normal judgement and insight    Assessment/Plan   Problem List Items Addressed This Visit    None  Visit Diagnoses         Codes    COPD exacerbation (CMS/[facility])     J44.1    Encounter for screening mammogram for malignant neoplasm of breast     Z12.31    Relevant Orders    BI mammo bilateral screening tomosynthesis          Scribe Attestation  By signing my name below, Florina SALDAÑA RMA, Scribe   attest that this documentation has been prepared under the direction and in the presence of Florin Posada DO.    Provider Attestation - Scribe documentation    All medical record entries made by the Scribe were at my direction and personally dictated by me. I have reviewed the chart and agree that the " record accurately reflects my personal performance of the history, physical exam, discussion and plan.

## 2024-01-24 ENCOUNTER — HOSPITAL ENCOUNTER (OUTPATIENT)
Dept: RADIOLOGY | Facility: CLINIC | Age: 59
Discharge: HOME | End: 2024-01-24
Payer: COMMERCIAL

## 2024-01-24 DIAGNOSIS — Z12.31 ENCOUNTER FOR SCREENING MAMMOGRAM FOR MALIGNANT NEOPLASM OF BREAST: ICD-10-CM

## 2024-01-24 PROCEDURE — 77067 SCR MAMMO BI INCL CAD: CPT | Mod: BILATERAL PROCEDURE | Performed by: RADIOLOGY

## 2024-01-24 PROCEDURE — 77067 SCR MAMMO BI INCL CAD: CPT

## 2024-01-24 PROCEDURE — 77063 BREAST TOMOSYNTHESIS BI: CPT | Mod: BILATERAL PROCEDURE | Performed by: RADIOLOGY

## 2024-01-26 ENCOUNTER — TELEPHONE (OUTPATIENT)
Dept: PRIMARY CARE | Facility: CLINIC | Age: 59
End: 2024-01-26
Payer: COMMERCIAL

## 2024-01-26 DIAGNOSIS — Z72.0 TOBACCO ABUSE: ICD-10-CM

## 2024-01-26 DIAGNOSIS — F17.219 CIGARETTE NICOTINE DEPENDENCE WITH NICOTINE-INDUCED DISORDER: ICD-10-CM

## 2024-01-26 NOTE — TELEPHONE ENCOUNTER
Dr Posada pt    Pt phoned office and is requesting a referral for a CT low dose lung screening.    Lung cancer runs in her family and would like to have this done.

## 2024-02-08 DIAGNOSIS — J43.9 PULMONARY EMPHYSEMA, UNSPECIFIED EMPHYSEMA TYPE (MULTI): Primary | ICD-10-CM

## 2024-02-08 NOTE — PROGRESS NOTES
Patient identified as part of population health initiative to improve control of COPD across primary care practices. Referral placed to see practice pharmacist for COPD management review.

## 2024-02-12 ENCOUNTER — HOSPITAL ENCOUNTER (OUTPATIENT)
Dept: RADIOLOGY | Facility: CLINIC | Age: 59
Discharge: HOME | End: 2024-02-12
Payer: COMMERCIAL

## 2024-02-12 DIAGNOSIS — Z72.0 TOBACCO ABUSE: ICD-10-CM

## 2024-02-12 DIAGNOSIS — F17.219 CIGARETTE NICOTINE DEPENDENCE WITH NICOTINE-INDUCED DISORDER: ICD-10-CM

## 2024-02-12 PROCEDURE — 71271 CT THORAX LUNG CANCER SCR C-: CPT

## 2024-02-29 DIAGNOSIS — I10 PRIMARY HYPERTENSION: ICD-10-CM

## 2024-02-29 DIAGNOSIS — E78.5 DYSLIPIDEMIA: ICD-10-CM

## 2024-02-29 DIAGNOSIS — J44.9 CHRONIC OBSTRUCTIVE PULMONARY DISEASE, UNSPECIFIED (MULTI): ICD-10-CM

## 2024-02-29 RX ORDER — ROSUVASTATIN CALCIUM 20 MG/1
TABLET, COATED ORAL
Qty: 30 TABLET | Refills: 0 | Status: SHIPPED | OUTPATIENT
Start: 2024-02-29 | End: 2024-05-20

## 2024-02-29 RX ORDER — ATENOLOL 50 MG/1
TABLET ORAL
Qty: 30 TABLET | Refills: 0 | Status: SHIPPED | OUTPATIENT
Start: 2024-02-29 | End: 2024-06-07

## 2024-03-02 RX ORDER — BUDESONIDE, GLYCOPYRROLATE, AND FORMOTEROL FUMARATE 160; 9; 4.8 UG/1; UG/1; UG/1
AEROSOL, METERED RESPIRATORY (INHALATION)
Qty: 10.7 G | Refills: 3 | Status: SHIPPED | OUTPATIENT
Start: 2024-03-02

## 2024-03-19 ENCOUNTER — TELEPHONE (OUTPATIENT)
Dept: PRIMARY CARE | Facility: CLINIC | Age: 59
End: 2024-03-19
Payer: COMMERCIAL

## 2024-03-19 DIAGNOSIS — R10.11 RIGHT UPPER QUADRANT PAIN: ICD-10-CM

## 2024-03-19 NOTE — TELEPHONE ENCOUNTER
Pt is wondering if there is a lab order she can have placed in regards to the pain in her right side under rib? She says JE is aware. Please advise.

## 2024-03-26 DIAGNOSIS — I10 PRIMARY HYPERTENSION: ICD-10-CM

## 2024-03-26 RX ORDER — LISINOPRIL AND HYDROCHLOROTHIAZIDE 12.5; 2 MG/1; MG/1
TABLET ORAL
Qty: 30 TABLET | Refills: 0 | Status: SHIPPED | OUTPATIENT
Start: 2024-03-26 | End: 2024-04-08

## 2024-03-27 ENCOUNTER — HOSPITAL ENCOUNTER (OUTPATIENT)
Dept: RADIOLOGY | Facility: CLINIC | Age: 59
Discharge: HOME | End: 2024-03-27
Payer: COMMERCIAL

## 2024-03-27 ENCOUNTER — LAB (OUTPATIENT)
Dept: LAB | Facility: LAB | Age: 59
End: 2024-03-27
Payer: COMMERCIAL

## 2024-03-27 DIAGNOSIS — R10.11 RIGHT UPPER QUADRANT PAIN: ICD-10-CM

## 2024-03-27 DIAGNOSIS — R73.03 PREDIABETES: ICD-10-CM

## 2024-03-27 DIAGNOSIS — E78.5 DYSLIPIDEMIA: ICD-10-CM

## 2024-03-27 LAB
ALBUMIN SERPL BCP-MCNC: 4.1 G/DL (ref 3.4–5)
ALP SERPL-CCNC: 71 U/L (ref 33–110)
ALT SERPL W P-5'-P-CCNC: 13 U/L (ref 7–45)
ANION GAP SERPL CALC-SCNC: 11 MMOL/L (ref 10–20)
AST SERPL W P-5'-P-CCNC: 16 U/L (ref 9–39)
BASOPHILS # BLD AUTO: 0.06 X10*3/UL (ref 0–0.1)
BASOPHILS NFR BLD AUTO: 0.9 %
BILIRUB SERPL-MCNC: 0.5 MG/DL (ref 0–1.2)
BUN SERPL-MCNC: 12 MG/DL (ref 6–23)
CALCIUM SERPL-MCNC: 9.3 MG/DL (ref 8.6–10.3)
CHLORIDE SERPL-SCNC: 103 MMOL/L (ref 98–107)
CHOLEST SERPL-MCNC: 173 MG/DL (ref 0–199)
CHOLESTEROL/HDL RATIO: 4.7
CO2 SERPL-SCNC: 31 MMOL/L (ref 21–32)
CREAT SERPL-MCNC: 0.76 MG/DL (ref 0.5–1.05)
EGFRCR SERPLBLD CKD-EPI 2021: >90 ML/MIN/1.73M*2
EOSINOPHIL # BLD AUTO: 0.14 X10*3/UL (ref 0–0.7)
EOSINOPHIL NFR BLD AUTO: 2.1 %
ERYTHROCYTE [DISTWIDTH] IN BLOOD BY AUTOMATED COUNT: 11.8 % (ref 11.5–14.5)
ERYTHROCYTE [SEDIMENTATION RATE] IN BLOOD BY WESTERGREN METHOD: 16 MM/H (ref 0–30)
GLUCOSE SERPL-MCNC: 97 MG/DL (ref 74–99)
HCT VFR BLD AUTO: 38.8 % (ref 36–46)
HDLC SERPL-MCNC: 36.6 MG/DL
HGB BLD-MCNC: 13.2 G/DL (ref 12–16)
IMM GRANULOCYTES # BLD AUTO: 0.01 X10*3/UL (ref 0–0.7)
IMM GRANULOCYTES NFR BLD AUTO: 0.2 % (ref 0–0.9)
LDLC SERPL CALC-MCNC: 71 MG/DL
LYMPHOCYTES # BLD AUTO: 2.25 X10*3/UL (ref 1.2–4.8)
LYMPHOCYTES NFR BLD AUTO: 34 %
MCH RBC QN AUTO: 31.1 PG (ref 26–34)
MCHC RBC AUTO-ENTMCNC: 34 G/DL (ref 32–36)
MCV RBC AUTO: 91 FL (ref 80–100)
MONOCYTES # BLD AUTO: 0.58 X10*3/UL (ref 0.1–1)
MONOCYTES NFR BLD AUTO: 8.8 %
NEUTROPHILS # BLD AUTO: 3.58 X10*3/UL (ref 1.2–7.7)
NEUTROPHILS NFR BLD AUTO: 54 %
NON HDL CHOLESTEROL: 136 MG/DL (ref 0–149)
NRBC BLD-RTO: 0 /100 WBCS (ref 0–0)
PLATELET # BLD AUTO: 264 X10*3/UL (ref 150–450)
POTASSIUM SERPL-SCNC: 3.6 MMOL/L (ref 3.5–5.3)
PROT SERPL-MCNC: 6.4 G/DL (ref 6.4–8.2)
RBC # BLD AUTO: 4.25 X10*6/UL (ref 4–5.2)
SODIUM SERPL-SCNC: 141 MMOL/L (ref 136–145)
T4 FREE SERPL-MCNC: 0.73 NG/DL (ref 0.61–1.12)
T4 SERPL-MCNC: 9.1 UG/DL (ref 4.5–11.1)
TRIGL SERPL-MCNC: 329 MG/DL (ref 0–149)
TSH SERPL-ACNC: 2.14 MIU/L (ref 0.44–3.98)
VLDL: 66 MG/DL (ref 0–40)
WBC # BLD AUTO: 6.6 X10*3/UL (ref 4.4–11.3)

## 2024-03-27 PROCEDURE — 85025 COMPLETE CBC W/AUTO DIFF WBC: CPT

## 2024-03-27 PROCEDURE — 84443 ASSAY THYROID STIM HORMONE: CPT

## 2024-03-27 PROCEDURE — 36415 COLL VENOUS BLD VENIPUNCTURE: CPT

## 2024-03-27 PROCEDURE — 80061 LIPID PANEL: CPT

## 2024-03-27 PROCEDURE — 76700 US EXAM ABDOM COMPLETE: CPT

## 2024-03-27 PROCEDURE — 80053 COMPREHEN METABOLIC PANEL: CPT

## 2024-03-27 PROCEDURE — 85652 RBC SED RATE AUTOMATED: CPT

## 2024-03-27 PROCEDURE — 84436 ASSAY OF TOTAL THYROXINE: CPT

## 2024-03-27 PROCEDURE — 76700 US EXAM ABDOM COMPLETE: CPT | Performed by: RADIOLOGY

## 2024-03-27 PROCEDURE — 84439 ASSAY OF FREE THYROXINE: CPT

## 2024-04-03 ENCOUNTER — TELEMEDICINE (OUTPATIENT)
Dept: PRIMARY CARE | Facility: CLINIC | Age: 59
End: 2024-04-03
Payer: COMMERCIAL

## 2024-04-03 DIAGNOSIS — Z82.49 FAMILY HISTORY OF CORONARY ARTERY DISEASE: ICD-10-CM

## 2024-04-03 DIAGNOSIS — I25.10 CORONARY ARTERY CALCIFICATION: ICD-10-CM

## 2024-04-03 DIAGNOSIS — I25.84 CORONARY ARTERY CALCIFICATION: ICD-10-CM

## 2024-04-03 DIAGNOSIS — Z72.0 TOBACCO ABUSE: ICD-10-CM

## 2024-04-03 DIAGNOSIS — K76.0 FATTY LIVER: ICD-10-CM

## 2024-04-03 DIAGNOSIS — J43.9 PULMONARY EMPHYSEMA, UNSPECIFIED EMPHYSEMA TYPE (MULTI): Primary | ICD-10-CM

## 2024-04-03 DIAGNOSIS — R10.11 RIGHT UPPER QUADRANT PAIN: ICD-10-CM

## 2024-04-03 DIAGNOSIS — Z87.81 HISTORY OF RIB FRACTURE: ICD-10-CM

## 2024-04-03 PROCEDURE — 99214 OFFICE O/P EST MOD 30 MIN: CPT | Performed by: FAMILY MEDICINE

## 2024-04-03 ASSESSMENT — ENCOUNTER SYMPTOMS
PHOTOPHOBIA: 0
COLOR CHANGE: 0
CONSTITUTIONAL NEGATIVE: 1
AGITATION: 0
SPEECH DIFFICULTY: 0
APPETITE CHANGE: 0
CONSTIPATION: 0
FLANK PAIN: 0
FEVER: 0
DIZZINESS: 0
SINUS PRESSURE: 0
ABDOMINAL DISTENTION: 0
BLOOD IN STOOL: 0
ARTHRALGIAS: 0
NECK STIFFNESS: 0
ABDOMINAL PAIN: 1
CONFUSION: 0
HEMATURIA: 0
SEIZURES: 0
RECTAL PAIN: 0
COUGH: 0
POLYDIPSIA: 0
ACTIVITY CHANGE: 0
SHORTNESS OF BREATH: 0
RHINORRHEA: 0
DIARRHEA: 0
SINUS PAIN: 0
NERVOUS/ANXIOUS: 0
TROUBLE SWALLOWING: 0
DYSURIA: 0
DECREASED CONCENTRATION: 0
PALPITATIONS: 0
HEADACHES: 0
ADENOPATHY: 0
EYE PAIN: 0
SORE THROAT: 0
STRIDOR: 0
DYSPHORIC MOOD: 0
CHEST TIGHTNESS: 0
SLEEP DISTURBANCE: 0
POLYPHAGIA: 0
FATIGUE: 0
MYALGIAS: 0

## 2024-04-03 NOTE — PROGRESS NOTES
Subjective   Patient ID: Karol Rodas is a 59 y.o. female who presents for Results.    HPI   Patient had CT lungs done 02/12/2024.  US abdomen 03/27/2024.    Review of Systems   Constitutional: Negative.  Negative for activity change, appetite change, fatigue and fever.   HENT:  Negative for congestion, dental problem, ear discharge, ear pain, mouth sores, rhinorrhea, sinus pressure, sinus pain, sore throat, tinnitus and trouble swallowing.    Eyes:  Negative for photophobia, pain and visual disturbance.   Respiratory:  Negative for cough, chest tightness, shortness of breath and stridor.    Cardiovascular:  Negative for chest pain and palpitations.   Gastrointestinal:  Positive for abdominal pain. Negative for abdominal distention, blood in stool, constipation, diarrhea and rectal pain.   Endocrine: Negative for cold intolerance, heat intolerance, polydipsia, polyphagia and polyuria.   Genitourinary:  Negative for dysuria, flank pain, hematuria and urgency.   Musculoskeletal:  Negative for arthralgias, gait problem, myalgias and neck stiffness.   Skin:  Negative for color change and rash.   Allergic/Immunologic: Negative for environmental allergies and food allergies.   Neurological:  Negative for dizziness, seizures, syncope, speech difficulty and headaches.   Hematological:  Negative for adenopathy.   Psychiatric/Behavioral:  Negative for agitation, confusion, decreased concentration, dysphoric mood and sleep disturbance. The patient is not nervous/anxious.        Objective   There were no vitals taken for this visit.    Physical Exam  Constitutional: Well developed, well nourished, alert and in no acute distress   Psychiatric: Mood calm and affect normal    Virtual Visit - Audio and Visual Communication Real Time     Assessment/Plan   Problem List Items Addressed This Visit             ICD-10-CM    Chronic obstructive pulmonary disease (CMS/HCC) - Primary J44.9    Fatty liver K76.0    Tobacco abuse Z72.0     Coronary artery calcification I25.10, I25.84    Relevant Orders    Referral to Cardiology     Other Visit Diagnoses         Codes    History of rib fracture     Z87.81    Relevant Orders    XR ribs 3 views bilateral w chest pa or ap    Right upper quadrant pain     R10.11    Relevant Orders    XR ribs 3 views bilateral w chest pa or ap    Family history of coronary artery disease     Z82.49          Scribe Attestation  By signing my name below, I, Florin Posada DO , Bianca   attest that this documentation has been prepared under the direction and in the presence of Florin Posada DO.    Provider Attestation - Scribe documentation    All medical record entries made by the Scribe were at my direction and personally dictated by me. I have reviewed the chart and agree that the record accurately reflects my personal performance of the history, physical exam, discussion and plan.

## 2024-04-03 NOTE — PATIENT INSTRUCTIONS
Follow up in    Continue current medications and therapy for chronic medical conditions.    Advised to discontinue tobacco use     Cardiology referral    XR bilateral ribs ordered    Repeat CT lungs 1 year

## 2024-04-05 PROBLEM — I25.84 CORONARY ARTERY CALCIFICATION: Status: ACTIVE | Noted: 2024-04-05

## 2024-04-05 PROBLEM — I25.10 CORONARY ARTERY CALCIFICATION: Status: ACTIVE | Noted: 2024-04-05

## 2024-04-06 DIAGNOSIS — K21.9 GASTROESOPHAGEAL REFLUX DISEASE, UNSPECIFIED WHETHER ESOPHAGITIS PRESENT: ICD-10-CM

## 2024-04-06 DIAGNOSIS — I10 PRIMARY HYPERTENSION: ICD-10-CM

## 2024-04-08 RX ORDER — LISINOPRIL AND HYDROCHLOROTHIAZIDE 12.5; 2 MG/1; MG/1
TABLET ORAL
Qty: 30 TABLET | Refills: 0 | Status: SHIPPED | OUTPATIENT
Start: 2024-04-08 | End: 2024-05-10

## 2024-04-08 RX ORDER — PANTOPRAZOLE SODIUM 40 MG/1
TABLET, DELAYED RELEASE ORAL
Qty: 60 TABLET | Refills: 0 | Status: SHIPPED | OUTPATIENT
Start: 2024-04-08 | End: 2024-06-07

## 2024-05-06 ENCOUNTER — TELEPHONE (OUTPATIENT)
Dept: PRIMARY CARE | Facility: CLINIC | Age: 59
End: 2024-05-06
Payer: COMMERCIAL

## 2024-05-06 DIAGNOSIS — B37.0 THRUSH, ORAL: ICD-10-CM

## 2024-05-06 NOTE — TELEPHONE ENCOUNTER
Dr. Posada Pt    Refill for  clotrimazole (Mycelex) 10 mg june [[id number]]    [facility] - [place], [place] - [address]

## 2024-05-07 RX ORDER — CLOTRIMAZOLE 10 MG/1
10 LOZENGE ORAL; TOPICAL
Qty: 50 TABLET | Refills: 0 | Status: SHIPPED | OUTPATIENT
Start: 2024-05-07 | End: 2024-05-16 | Stop reason: WASHOUT

## 2024-05-10 DIAGNOSIS — I10 PRIMARY HYPERTENSION: ICD-10-CM

## 2024-05-10 RX ORDER — LISINOPRIL AND HYDROCHLOROTHIAZIDE 12.5; 2 MG/1; MG/1
TABLET ORAL
Qty: 30 TABLET | Refills: 0 | Status: SHIPPED | OUTPATIENT
Start: 2024-05-10 | End: 2024-05-21 | Stop reason: SDUPTHER

## 2024-05-16 ENCOUNTER — OFFICE VISIT (OUTPATIENT)
Dept: CARDIOLOGY | Facility: CLINIC | Age: 59
End: 2024-05-16
Payer: COMMERCIAL

## 2024-05-16 VITALS
WEIGHT: 123.5 LBS | HEIGHT: 61 IN | BODY MASS INDEX: 23.32 KG/M2 | SYSTOLIC BLOOD PRESSURE: 177 MMHG | HEART RATE: 72 BPM | OXYGEN SATURATION: 93 % | DIASTOLIC BLOOD PRESSURE: 101 MMHG

## 2024-05-16 DIAGNOSIS — I25.84 CORONARY ARTERY CALCIFICATION: Primary | ICD-10-CM

## 2024-05-16 DIAGNOSIS — J43.9 PULMONARY EMPHYSEMA, UNSPECIFIED EMPHYSEMA TYPE (MULTI): ICD-10-CM

## 2024-05-16 DIAGNOSIS — E78.5 DYSLIPIDEMIA: ICD-10-CM

## 2024-05-16 DIAGNOSIS — I10 PRIMARY HYPERTENSION: ICD-10-CM

## 2024-05-16 DIAGNOSIS — I25.10 CORONARY ARTERY CALCIFICATION: Primary | ICD-10-CM

## 2024-05-16 DIAGNOSIS — F10.10 ETOH ABUSE: ICD-10-CM

## 2024-05-16 DIAGNOSIS — Z72.0 TOBACCO ABUSE: ICD-10-CM

## 2024-05-16 PROCEDURE — 3080F DIAST BP >= 90 MM HG: CPT | Performed by: INTERNAL MEDICINE

## 2024-05-16 PROCEDURE — 93005 ELECTROCARDIOGRAM TRACING: CPT | Performed by: INTERNAL MEDICINE

## 2024-05-16 PROCEDURE — 99204 OFFICE O/P NEW MOD 45 MIN: CPT | Performed by: INTERNAL MEDICINE

## 2024-05-16 PROCEDURE — 99214 OFFICE O/P EST MOD 30 MIN: CPT | Performed by: INTERNAL MEDICINE

## 2024-05-16 PROCEDURE — 3077F SYST BP >= 140 MM HG: CPT | Performed by: INTERNAL MEDICINE

## 2024-05-16 PROCEDURE — 93010 ELECTROCARDIOGRAM REPORT: CPT | Performed by: INTERNAL MEDICINE

## 2024-05-16 RX ORDER — ICOSAPENT ETHYL 1 G/1
2 CAPSULE ORAL
Qty: 120 CAPSULE | Refills: 11 | Status: SHIPPED | OUTPATIENT
Start: 2024-05-16 | End: 2025-05-16

## 2024-05-16 ASSESSMENT — ENCOUNTER SYMPTOMS
PSYCHIATRIC NEGATIVE: 1
CONSTITUTIONAL NEGATIVE: 1
OCCASIONAL FEELINGS OF UNSTEADINESS: 0
DEPRESSION: 0
NEUROLOGICAL NEGATIVE: 1
ALLERGIC/IMMUNOLOGIC NEGATIVE: 1
HEMATOLOGIC/LYMPHATIC NEGATIVE: 1
RESPIRATORY NEGATIVE: 1
GASTROINTESTINAL NEGATIVE: 1
CARDIOVASCULAR NEGATIVE: 1
LOSS OF SENSATION IN FEET: 0
MUSCULOSKELETAL NEGATIVE: 1
EYES NEGATIVE: 1
ENDOCRINE NEGATIVE: 1

## 2024-05-16 ASSESSMENT — COLUMBIA-SUICIDE SEVERITY RATING SCALE - C-SSRS
2. HAVE YOU ACTUALLY HAD ANY THOUGHTS OF KILLING YOURSELF?: NO
6. HAVE YOU EVER DONE ANYTHING, STARTED TO DO ANYTHING, OR PREPARED TO DO ANYTHING TO END YOUR LIFE?: NO
1. IN THE PAST MONTH, HAVE YOU WISHED YOU WERE DEAD OR WISHED YOU COULD GO TO SLEEP AND NOT WAKE UP?: NO

## 2024-05-16 ASSESSMENT — PATIENT HEALTH QUESTIONNAIRE - PHQ9
SUM OF ALL RESPONSES TO PHQ9 QUESTIONS 1 AND 2: 0
1. LITTLE INTEREST OR PLEASURE IN DOING THINGS: NOT AT ALL
2. FEELING DOWN, DEPRESSED OR HOPELESS: NOT AT ALL

## 2024-05-16 NOTE — PROGRESS NOTES
Preventive Cardiology Clinic Note    Reason for Visit: New Patient Visit.  Referring Clinician: Albino     History of Present Illness: Karol Rodas is a 59 y.o. female with multiple cardiovascular risk factors including hypertension, dyslipidemia, tobacco and alcohol abuse, and coronary artery calcification who is referred for cardiovascular risk assessment.  She does not report any exertional chest pain, worsening shortness of breath or palpitations, or syncope.  She reports adherence to her medications although her pharmacy refill data show that several of her medications were not refilled since February with only 30-day supply.  She cannot remember all of the medications that she takes and she does not monitor her blood pressure at home.  She is a current smoker and has several drinks per day multiple times per week but is seen about cutting down after retiring.  She does not have a family history of premature CAD.  She had a previous echocardiogram 2 years ago that was normal.  She has a history of COPD with stable exertional dyspnea but has not worsened or progressed.  She currently takes low-dose aspirin and high intensity statin for primary cardiovascular prevention.  She also notes that she had a cardiac catheterization around 2017 at [facility] and that she was told that she had nonobstructive disease and was close to being blocked.  No revascularization was performed at that time.    Past Medical History:  She has a past medical history of Acute upper respiratory infection, unspecified (03/23/2020), Encounter for immunization, Encounter for other screening for malignant neoplasm of breast (02/12/2021), Essential (primary) hypertension (09/13/2022), Gastro-esophageal reflux disease without esophagitis (04/20/2021), Hyperlipidemia, unspecified (06/22/2022), Immunization not carried out because of patient refusal, and Personal history of other benign neoplasm (07/26/2021).    Past Surgical  History:  She has a past surgical history that includes Other surgical history (10/01/2019).    Social History:  She reports that she has been smoking cigarettes. She has a 15 pack-year smoking history. She has never used smokeless tobacco. She reports current alcohol use. She reports that she does not use drugs.    Family History:  No family history of premature CAD.    Allergies:  Latex, Penicillins, and Meperidine    Outpatient Medications:  Current Outpatient Medications   Medication Instructions    aspirin 81 mg EC tablet 1 tablet, oral, Daily    atenolol (Tenormin) 50 mg tablet TAKE 1/2 (ONE-HALF) TABLET BY MOUTH IN THE MORNING AND 1/2 (ONE-HALF) TAB IN THE EVENING    Breztri Aerosphere 160-9-4.8 mcg/actuation HFA aerosol inhaler INHALE 2 PUFFS BY MOUTH TWICE DAILY    ipratropium-albuteroL (Duo-Neb) 0.5-2.5 mg/3 mL nebulizer solution 3 mL, nebulization, Every 4 hours PRN    lisinopriL-hydrochlorothiazide 20-12.5 mg tablet Take 1 tablet by mouth once daily    meloxicam (MOBIC) 15 mg, oral, Daily    nystatin (MYCOSTATIN) 500,000 Units, oral, 4 times daily, Swish in mouth and swallow    pantoprazole (ProtoNix) 40 mg EC tablet Take 1 tablet by mouth twice daily    rosuvastatin (Crestor) 20 mg tablet TAKE 1 TABLET BY MOUTH ONCE DAILY AT NIGHT       Review of Systems:  Review of Systems   Constitutional: Negative.   HENT: Negative.     Eyes: Negative.    Cardiovascular: Negative.    Respiratory: Negative.     Endocrine: Negative.    Hematologic/Lymphatic: Negative.    Skin: Negative.    Musculoskeletal: Negative.    Gastrointestinal: Negative.    Genitourinary: Negative.    Neurological: Negative.    Psychiatric/Behavioral: Negative.     Allergic/Immunologic: Negative.        Last Recorded Vitals:  Vitals:    05/16/24 0826 05/16/24 0833   BP: (!) 169/96 (!) 177/101   BP Location: Right arm Left arm   Patient Position: Sitting Sitting   BP Cuff Size: Adult Adult   Pulse: 72    SpO2: 93%    Weight: 56 kg (123 lb 8 oz)  "   Height: 1.549 m (5' 1\")        Physical Examination:  General: Well appearing, well-nourished, in no acute distress.  HEENT: Normocephalic atraumatic, pupils equal and reactive to light, extraocular muscles intact, no conjunctival injection, oropharynx clear without exudates.  Neck: Normal carotid arterial pulses, no arterial bruits, no thyromegaly.  Cardiac: Regular rhythm and normal heart rate.  S1, S2 present and normal.  No murmurs, rubs, or gallops.  PMI is nondisplaced. Jugular venous pulsations are normal.  Pulmonary: Normal breath sounds, no increased work of breathing, no wheezes or crackles.  GI: Normal bowel sounds, abdominal aorta not enlarged, no hepatosplenomegaly, no abdominal bruits.  Lower extremities: No cyanosis, clubbing, or edema.  No xanthelasma present. Normal distal pulses.  Skin: Skin intact. No significant rashes or lesions present.  Neuro: Alert and oriented x 3, normal attention and cognition, no focal motor or sensory neurologic deficits.  Psych: Normal affect and mood.  Musculoskeletal: Normal gait normal muscle tone.    Laboratory Studies:  Lab Results   Component Value Date    GLUCOSE 97 03/27/2024    CALCIUM 9.3 03/27/2024     03/27/2024    K 3.6 03/27/2024    CO2 31 03/27/2024     03/27/2024    BUN 12 03/27/2024    CREATININE 0.76 03/27/2024     Lab Results   Component Value Date    ALT 13 03/27/2024    AST 16 03/27/2024    ALKPHOS 71 03/27/2024    BILITOT 0.5 03/27/2024         Lab Results   Component Value Date    CHOL 173 03/27/2024    CHOL 206 (H) 05/24/2022    CHOL 263 (H) 03/24/2021     Lab Results   Component Value Date    HDL 36.6 03/27/2024    HDL 33.0 (A) 05/24/2022    HDL 35.6 (A) 03/24/2021     Lab Results   Component Value Date    LDLCALC 71 03/27/2024     Lab Results   Component Value Date    TRIG 329 (H) 03/27/2024    TRIG 554 (H) 05/24/2022    TRIG 921 (H) 03/24/2021     No components found for: \"CHOLHDL\"  Lab Results   Component Value Date    HGBA1C " "5.8 (A) 2022     No components found for: \"UACR\"    Cardiology Tests:  EC2024  ECG in the office today shows normal sinus rhythm, ventricular rate 64 bpm, normal intervals and axis, normal ECG.    Echo:2022  1. The left ventricular systolic function is normal with a 55-60% estimated ejection fraction.   2. No significant valvular abnormality.    Cardiac Imaging:  CT Lung cancer screening 2024  Moderate coronary artery calcification. Correlate with coronary  artery disease risk factors.      Assessment and Plan:  Problem List Items Addressed This Visit          Cardiac and Vasculature    Dyslipidemia    HTN (hypertension)    Coronary artery calcification - Primary    Relevant Orders    ECG 12 Lead       Mental Health    ETOH abuse       Pulmonary and Pneumonias    Chronic obstructive pulmonary disease (Multi)       Tobacco    Tobacco abuse     Karol Rodas is a 59 y.o. female with multiple cardiovascular risk factors including hypertension, dyslipidemia, tobacco and alcohol abuse, and coronary artery calcification who is referred for cardiovascular risk assessment.  She does not appear to have any significant cardiovascular symptoms to warrant further functional testing at this time.  However we should get a dedicated coronary calcium score and if her score is greater than 1000 I would recommend stress testing given the higher likelihood of multivessel disease with high risk factor burden.  She does require aggressive risk factor modification for primary prevention and we discussed tobacco and alcohol cessation, potentially more aggressive blood pressure control, and improvement in her lipid profile especially her triglycerides.  I counseled her regarding that her triglycerides are most likely related to alcohol use but I do recommend the addition of icosapent ethyl to reduce her triglycerides and lower cardiovascular risk.  I also recommend a 24-hour ambulatory blood pressure monitor for " more objective assessment of her blood pressure given that previous to today's visit and a prior office visit her blood pressure was better controlled.  I am also unsure of her adherence to antihypertensive medication given her pharmacy refill data.  Based upon the test results I may recommend dose escalation of her antihypertensive medication.  I recommend she continue the low-dose aspirin and high intensity statin as she is doing currently.  After the testing is completed I will contact her with results and further recommendations.  Please do not hesitate to call or contact me with questions or concerns.    Juan Francisco Sanchez MD, FAHA, FACC  Director,  [facility]  Director,  [facility]  Associate Professor of Medicine  [employer]

## 2024-05-16 NOTE — PROGRESS NOTES
Referred by Dr. Posada for No chief complaint on file.     History Of Present Illness:    Karol Rodas is a 59 y.o. female presenting with ***.      Past Medical History:  She has a past medical history of Acute upper respiratory infection, unspecified (03/23/2020), Encounter for immunization, Encounter for other screening for malignant neoplasm of breast (02/12/2021), Essential (primary) hypertension (09/13/2022), Gastro-esophageal reflux disease without esophagitis (04/20/2021), Hyperlipidemia, unspecified (06/22/2022), Immunization not carried out because of patient refusal, and Personal history of other benign neoplasm (07/26/2021).    Past Surgical History:  She has a past surgical history that includes Other surgical history (10/01/2019).      Social History:  She reports that she has been smoking cigarettes. She has a 15 pack-year smoking history. She has never used smokeless tobacco. She reports current alcohol use. She reports that she does not use drugs.    Family History:  No family history on file.     Allergies:  Latex, Penicillins, and Meperidine    Outpatient Medications:  Current Outpatient Medications   Medication Instructions    aspirin 81 mg EC tablet 1 tablet, oral, Daily    atenolol (Tenormin) 50 mg tablet TAKE 1/2 (ONE-HALF) TABLET BY MOUTH IN THE MORNING AND 1/2 (ONE-HALF) TAB IN THE EVENING    azelastine (Astelin) 137 mcg (0.1 %) nasal spray 1 spray, Each Nostril, 2 times daily    Breztri Aerosphere 160-9-4.8 mcg/actuation HFA aerosol inhaler INHALE 2 PUFFS BY MOUTH TWICE DAILY    clotrimazole (MYCELEX) 10 mg, oral, 5 times daily    ipratropium-albuteroL (Duo-Neb) 0.5-2.5 mg/3 mL nebulizer solution 3 mL, nebulization, Every 4 hours PRN    lisinopriL-hydrochlorothiazide 20-12.5 mg tablet Take 1 tablet by mouth once daily    meloxicam (MOBIC) 15 mg, oral, Daily    meloxicam (MOBIC) 15 mg, oral, Daily    nystatin (MYCOSTATIN) 500,000 Units, oral, 4 times daily, Swish in mouth and  "swallow    pantoprazole (ProtoNix) 40 mg EC tablet Take 1 tablet by mouth twice daily    rosuvastatin (Crestor) 20 mg tablet TAKE 1 TABLET BY MOUTH ONCE DAILY AT NIGHT        Last Recorded Vitals:  There were no vitals filed for this visit.    Physical Exam:  ***       Last Labs:  CBC -  Lab Results   Component Value Date    WBC 6.6 03/27/2024    HGB 13.2 03/27/2024    HCT 38.8 03/27/2024    MCV 91 03/27/2024     03/27/2024       CMP -  Lab Results   Component Value Date    CALCIUM 9.3 03/27/2024    PROT 6.4 03/27/2024    ALBUMIN 4.1 03/27/2024    AST 16 03/27/2024    ALT 13 03/27/2024    ALKPHOS 71 03/27/2024    BILITOT 0.5 03/27/2024       LIPID PANEL -   Lab Results   Component Value Date    CHOL 173 03/27/2024    TRIG 329 (H) 03/27/2024    HDL 36.6 03/27/2024    CHHDL 4.7 03/27/2024    LDLF - 05/24/2022    VLDL 66 (H) 03/27/2024    NHDL 136 03/27/2024       RENAL FUNCTION PANEL -   Lab Results   Component Value Date    GLUCOSE 97 03/27/2024     03/27/2024    K 3.6 03/27/2024     03/27/2024    CO2 31 03/27/2024    ANIONGAP 11 03/27/2024    BUN 12 03/27/2024    CREATININE 0.76 03/27/2024    CALCIUM 9.3 03/27/2024    ALBUMIN 4.1 03/27/2024        Lab Results   Component Value Date    BNP 69 10/09/2021    HGBA1C 5.8 (A) 05/24/2022       Last Cardiology Tests:  ECG:  No results found for this or any previous visit from the past 1095 days.    ***  Echo:  No results found for this or any previous visit from the past 1095 days.    ***  Ejection Fractions:  No results found for: \"EF\"  ***  Cath:  No results found for this or any previous visit from the past 1095 days.    ***  Stress Test:  No results found for this or any previous visit from the past 1095 days.    ***  Cardiac Imaging:  No results found for this or any previous visit from the past 1095 days.    ***    {Lab/Diag/Rad Review:27667}    Assessment/Plan   {Assess/Plan SmartLinks:11395}        Cassidy Champagne MD  "

## 2024-05-18 DIAGNOSIS — E78.5 DYSLIPIDEMIA: ICD-10-CM

## 2024-05-20 ENCOUNTER — TELEPHONE (OUTPATIENT)
Dept: PRIMARY CARE | Facility: CLINIC | Age: 59
End: 2024-05-20
Payer: COMMERCIAL

## 2024-05-20 ENCOUNTER — HOSPITAL ENCOUNTER (OUTPATIENT)
Dept: CARDIOLOGY | Facility: CLINIC | Age: 59
Discharge: HOME | End: 2024-05-20
Payer: COMMERCIAL

## 2024-05-20 DIAGNOSIS — I10 PRIMARY HYPERTENSION: ICD-10-CM

## 2024-05-20 PROCEDURE — 93790 AMBL BP MNTR W/SW I&R: CPT | Performed by: INTERNAL MEDICINE

## 2024-05-20 RX ORDER — ROSUVASTATIN CALCIUM 20 MG/1
TABLET, COATED ORAL
Qty: 90 TABLET | Refills: 3 | Status: SHIPPED | OUTPATIENT
Start: 2024-05-20

## 2024-05-21 DIAGNOSIS — I10 PRIMARY HYPERTENSION: ICD-10-CM

## 2024-05-21 RX ORDER — LISINOPRIL AND HYDROCHLOROTHIAZIDE 12.5; 2 MG/1; MG/1
2 TABLET ORAL
Qty: 180 TABLET | Refills: 3 | Status: SHIPPED | OUTPATIENT
Start: 2024-05-21 | End: 2025-05-21

## 2024-05-21 NOTE — PROCEDURES
24-hour Ambulatory Blood Pressure Monitor Report  [facility] [place]    Device: [employer] Lorenza ABPM 7100    Period of Monitoring: From 5/20/2024, 9:24 AM to 5/21/2024, 10:00 AM.     Successful Readings: 47 (82 %)     Indication:   Suspected white coat hypertension    Current Medications:  Current Outpatient Medications on File Prior to Encounter   Medication Sig Dispense Refill    aspirin 81 mg EC tablet Take 1 tablet (81 mg) by mouth once daily.      atenolol (Tenormin) 50 mg tablet TAKE 1/2 (ONE-HALF) TABLET BY MOUTH IN THE MORNING AND 1/2 (ONE-HALF) TAB IN THE EVENING 30 tablet 0    Breztri Aerosphere 160-9-4.8 mcg/actuation HFA aerosol inhaler INHALE 2 PUFFS BY MOUTH TWICE DAILY 10.7 g 3    icosapent ethyL (Vascepa) 1 gram capsule Take 2 capsules (2 g) by mouth 2 times daily (morning and late afternoon). 120 capsule 11    ipratropium-albuteroL (Duo-Neb) 0.5-2.5 mg/3 mL nebulizer solution Take 3 mL by nebulization every 4 hours if needed for wheezing or shortness of breath. 180 mL 2    lisinopriL-hydrochlorothiazide 20-12.5 mg tablet Take 1 tablet by mouth once daily 30 tablet 0    meloxicam (Mobic) 15 mg tablet Take 1 tablet (15 mg) by mouth once daily. 30 tablet 11    nystatin (Mycostatin) 100,000 unit/mL suspension Take 5 mL (500,000 Units) by mouth 4 times a day. Swish in mouth and swallow 280 mL 0    pantoprazole (ProtoNix) 40 mg EC tablet Take 1 tablet by mouth twice daily 60 tablet 0    rosuvastatin (Crestor) 20 mg tablet TAKE 1 TABLET BY MOUTH ONCE DAILY AT NIGHT 90 tablet 3    [DISCONTINUED] azelastine (Astelin) 137 mcg (0.1 %) nasal spray Administer 1 spray into each nostril 2 times a day. 30 mL 12    [DISCONTINUED] clotrimazole (Mycelex) 10 mg june Take 1 tablet (10 mg) by mouth 5 times a day for 10 days. 50 tablet 0    [DISCONTINUED] meloxicam (Mobic) 15 mg tablet Take 1 tablet (15 mg) by mouth once daily. 30 tablet 3    [DISCONTINUED] rosuvastatin (Crestor) 20  mg tablet TAKE 1 TABLET BY MOUTH ONCE DAILY AT NIGHT 30 tablet 0     No current facility-administered medications on file prior to encounter.          Findings (Please see attached report):   Average ambulatory blood pressure was 134/85 mmHg with a heart rate of 73 beats per minute.     The highest SBP and DBP was 182 at 9:30 and 116 at 8:00.     The lowest SBP and DBP was 93 at 23:00 and 50 at 21:40.     From 8 a.m. to 11 p.m., average blood pressure was 135/86 mmHg with average heart rate of 73 beats per minute.     From 11 p.m. to 8 a.m., average blood pressure was 128/82 mmHg with a heart rate of 73 beats per minute.     Patient went to bed at unknown and woke up at unknown.    Patient reported symptoms: None     Impression: Average 24-hour ambulatory blood pressure of 134/85 mmHg indicates mildly uncontrolled blood pressure. Patient has abnormal nocturnal BP dipping. No white coat effect is seen.    Recommendation: Management per referring clinician.       Juan Francisco Sanchez MD, CANDY, [facility]  Director,  [facility] Cardiovascular [facility]  [facility] [place]  [facility]       Recommended standards for normal ambulatory blood pressure values which are proposed to be equivalent to clinic BP of <130/80 mmHg include: daytime BP <130/80 mg Hg, nighttime BP <110/65 mm Hg, and 24 hour BP <125/75 mm Hg.   (Norma RODRIGUEZ, et al.2017 High Blood Pressure Clinical Practice Guideline, Hypertension. 2017).     The clinical criteria for white coat hypertension are defined as:   Office blood pressure =130/80 mm Hg but <160/100 mm Hg after three month trial of lifestyle modification and suspected white coat hypertension with daytime ABPM or HBPM blood pressure <130/80 mm Hg.     The clinical criteria for masked hypertension are defined as:  Office blood pressure of 120 - 129/<80 mm Hg after three month trial of lifestyle modification and suspected masked hypertension with daytime ABPM or HBPM blood  pressure =130/80 mm Hg.   (Measurement of Blood Pressure in Humans, A Scientific Statement from the American Heart Association, May 2019).

## 2024-05-24 LAB
ATRIAL RATE: 64 BPM
P AXIS: -16 DEGREES
P OFFSET: 207 MS
P ONSET: 159 MS
PR INTERVAL: 120 MS
Q ONSET: 219 MS
QRS COUNT: 11 BEATS
QRS DURATION: 74 MS
QT INTERVAL: 432 MS
QTC CALCULATION(BAZETT): 445 MS
QTC FREDERICIA: 441 MS
R AXIS: 70 DEGREES
T AXIS: 71 DEGREES
T OFFSET: 435 MS
VENTRICULAR RATE: 64 BPM

## 2024-06-16 ENCOUNTER — HOSPITAL ENCOUNTER (OUTPATIENT)
Dept: RADIOLOGY | Facility: CLINIC | Age: 59
Discharge: HOME | End: 2024-06-16
Payer: COMMERCIAL

## 2024-06-16 DIAGNOSIS — I25.84 CORONARY ARTERY CALCIFICATION: ICD-10-CM

## 2024-06-16 DIAGNOSIS — I25.10 CORONARY ARTERY CALCIFICATION: ICD-10-CM

## 2024-06-16 PROCEDURE — 75571 CT HRT W/O DYE W/CA TEST: CPT

## 2024-06-28 DIAGNOSIS — B37.0 THRUSH, ORAL: ICD-10-CM

## 2024-06-28 RX ORDER — CLOTRIMAZOLE 10 MG/1
10 LOZENGE ORAL
Qty: 50 TROCHE | Refills: 0 | Status: SHIPPED | OUTPATIENT
Start: 2024-06-28 | End: 2024-07-08

## 2024-07-02 DIAGNOSIS — J44.9 CHRONIC OBSTRUCTIVE PULMONARY DISEASE, UNSPECIFIED (MULTI): ICD-10-CM

## 2024-07-08 RX ORDER — BUDESONIDE, GLYCOPYRROLATE, AND FORMOTEROL FUMARATE 160; 9; 4.8 UG/1; UG/1; UG/1
AEROSOL, METERED RESPIRATORY (INHALATION)
Qty: 10.7 G | Refills: 3 | Status: SHIPPED | OUTPATIENT
Start: 2024-07-08

## 2024-07-08 NOTE — TELEPHONE ENCOUNTER
Pt has been out of inhaler for 5 days now she says, please advise and send pended med to pharmacy for Breztri.

## 2024-08-08 DIAGNOSIS — I10 PRIMARY HYPERTENSION: ICD-10-CM

## 2024-08-08 DIAGNOSIS — K21.9 GASTROESOPHAGEAL REFLUX DISEASE, UNSPECIFIED WHETHER ESOPHAGITIS PRESENT: ICD-10-CM

## 2024-08-08 NOTE — TELEPHONE ENCOUNTER
Dr. Posada patient  Refill for pantoprazole (ProtoNix) 40 mg   atenolol (Tenormin) 50 mg tablet   [id number] - [place], [place] - [address]

## 2024-08-09 RX ORDER — ATENOLOL 50 MG/1
25 TABLET ORAL 2 TIMES DAILY
Qty: 30 TABLET | Refills: 2 | Status: SHIPPED | OUTPATIENT
Start: 2024-08-09

## 2024-08-09 RX ORDER — PANTOPRAZOLE SODIUM 40 MG/1
40 TABLET, DELAYED RELEASE ORAL 2 TIMES DAILY
Qty: 60 TABLET | Refills: 2 | Status: SHIPPED | OUTPATIENT
Start: 2024-08-09

## 2024-08-19 DIAGNOSIS — J44.9 CHRONIC OBSTRUCTIVE PULMONARY DISEASE, UNSPECIFIED (MULTI): ICD-10-CM

## 2024-08-19 RX ORDER — BUDESONIDE, GLYCOPYRROLATE, AND FORMOTEROL FUMARATE 160; 9; 4.8 UG/1; UG/1; UG/1
2 AEROSOL, METERED RESPIRATORY (INHALATION) 2 TIMES DAILY
Qty: 10.7 G | Refills: 3 | COMMUNITY
Start: 2024-08-19

## 2024-10-22 ENCOUNTER — TELEPHONE (OUTPATIENT)
Dept: PRIMARY CARE | Facility: CLINIC | Age: 59
End: 2024-10-22
Payer: COMMERCIAL

## 2024-10-22 DIAGNOSIS — K21.9 GASTROESOPHAGEAL REFLUX DISEASE, UNSPECIFIED WHETHER ESOPHAGITIS PRESENT: ICD-10-CM

## 2024-10-22 DIAGNOSIS — J44.9 CHRONIC OBSTRUCTIVE PULMONARY DISEASE, UNSPECIFIED: ICD-10-CM

## 2024-10-22 DIAGNOSIS — E78.5 DYSLIPIDEMIA: ICD-10-CM

## 2024-10-22 DIAGNOSIS — I10 PRIMARY HYPERTENSION: ICD-10-CM

## 2024-10-23 RX ORDER — LISINOPRIL AND HYDROCHLOROTHIAZIDE 12.5; 2 MG/1; MG/1
1 TABLET ORAL DAILY
Qty: 90 TABLET | Refills: 3 | Status: SHIPPED | OUTPATIENT
Start: 2024-10-23

## 2024-10-23 RX ORDER — ROSUVASTATIN CALCIUM 20 MG/1
20 TABLET, COATED ORAL DAILY
Qty: 90 TABLET | Refills: 3 | Status: SHIPPED | OUTPATIENT
Start: 2024-10-23

## 2024-10-23 RX ORDER — PANTOPRAZOLE SODIUM 40 MG/1
40 TABLET, DELAYED RELEASE ORAL
Qty: 90 TABLET | Refills: 3 | Status: SHIPPED | OUTPATIENT
Start: 2024-10-23

## 2024-10-23 RX ORDER — ATENOLOL 50 MG/1
50 TABLET ORAL DAILY
Qty: 90 TABLET | Refills: 3 | Status: SHIPPED | OUTPATIENT
Start: 2024-10-23

## 2024-10-23 RX ORDER — BUDESONIDE, GLYCOPYRROLATE, AND FORMOTEROL FUMARATE 160; 9; 4.8 UG/1; UG/1; UG/1
2 AEROSOL, METERED RESPIRATORY (INHALATION)
Qty: 10.7 G | Refills: 3 | Status: SHIPPED | OUTPATIENT
Start: 2024-10-23

## 2024-12-02 ENCOUNTER — HOSPITAL ENCOUNTER (OUTPATIENT)
Dept: RADIOLOGY | Facility: HOSPITAL | Age: 59
Discharge: HOME | End: 2024-12-02
Payer: OTHER GOVERNMENT

## 2024-12-02 ENCOUNTER — OFFICE VISIT (OUTPATIENT)
Dept: PRIMARY CARE | Facility: CLINIC | Age: 59
End: 2024-12-02
Payer: OTHER GOVERNMENT

## 2024-12-02 VITALS
TEMPERATURE: 98 F | SYSTOLIC BLOOD PRESSURE: 128 MMHG | OXYGEN SATURATION: 93 % | BODY MASS INDEX: 23.03 KG/M2 | RESPIRATION RATE: 16 BRPM | HEIGHT: 61 IN | DIASTOLIC BLOOD PRESSURE: 90 MMHG | HEART RATE: 65 BPM | WEIGHT: 122 LBS

## 2024-12-02 DIAGNOSIS — R06.2 WHEEZING: ICD-10-CM

## 2024-12-02 DIAGNOSIS — Z87.81 HISTORY OF RIB FRACTURE: ICD-10-CM

## 2024-12-02 DIAGNOSIS — J40 BRONCHITIS: Primary | ICD-10-CM

## 2024-12-02 DIAGNOSIS — R05.1 ACUTE COUGH: ICD-10-CM

## 2024-12-02 DIAGNOSIS — R10.11 RIGHT UPPER QUADRANT PAIN: ICD-10-CM

## 2024-12-02 PROCEDURE — 3074F SYST BP LT 130 MM HG: CPT | Performed by: NURSE PRACTITIONER

## 2024-12-02 PROCEDURE — 3080F DIAST BP >= 90 MM HG: CPT | Performed by: NURSE PRACTITIONER

## 2024-12-02 PROCEDURE — 3008F BODY MASS INDEX DOCD: CPT | Performed by: NURSE PRACTITIONER

## 2024-12-02 PROCEDURE — 71111 X-RAY EXAM RIBS/CHEST4/> VWS: CPT

## 2024-12-02 PROCEDURE — 99213 OFFICE O/P EST LOW 20 MIN: CPT | Performed by: NURSE PRACTITIONER

## 2024-12-02 PROCEDURE — 71111 X-RAY EXAM RIBS/CHEST4/> VWS: CPT | Performed by: RADIOLOGY

## 2024-12-02 RX ORDER — PREDNISONE 10 MG/1
TABLET ORAL
Qty: 30 TABLET | Refills: 0 | Status: SHIPPED | OUTPATIENT
Start: 2024-12-02 | End: 2024-12-11

## 2024-12-02 RX ORDER — AZITHROMYCIN 250 MG/1
TABLET, FILM COATED ORAL
Qty: 6 TABLET | Refills: 0 | Status: SHIPPED | OUTPATIENT
Start: 2024-12-02 | End: 2024-12-06

## 2024-12-02 ASSESSMENT — ENCOUNTER SYMPTOMS
VOMITING: 1
COUGH: 1
FEVER: 0
SINUS PAIN: 0
SHORTNESS OF BREATH: 0
DIZZINESS: 0
PALPITATIONS: 0
WEAKNESS: 0
CHILLS: 0
SORE THROAT: 1
NAUSEA: 0
HEADACHES: 1

## 2024-12-02 NOTE — PROGRESS NOTES
"Subjective   Patient ID: Karol Rodas is a 59 y.o. female who presents for URI.    URI   This is a new problem. The current episode started in the past 7 days. The problem has been gradually improving. There has been no fever. Associated symptoms include congestion, coughing, headaches, sneezing, a sore throat and vomiting. Pertinent negatives include no chest pain, ear pain, nausea, rash or sinus pain. Associated symptoms comments: Nasal drip, Throwing up Phlegm, Fatigue, Dizziness. . She has tried nothing for the symptoms.        Review of Systems   Constitutional:  Negative for chills and fever.   HENT:  Positive for congestion, sneezing and sore throat. Negative for ear pain and sinus pain.    Respiratory:  Positive for cough. Negative for shortness of breath.    Cardiovascular:  Negative for chest pain and palpitations.   Gastrointestinal:  Positive for vomiting. Negative for nausea.   Skin:  Negative for rash.   Neurological:  Positive for headaches. Negative for dizziness and weakness.       Objective   /90 (BP Location: Right arm, Patient Position: Sitting, BP Cuff Size: Adult)   Pulse 65   Temp 36.7 °C (98 °F) (Temporal)   Resp 16   Ht 1.549 m (5' 1\")   Wt 55.3 kg (122 lb)   SpO2 93%   BMI 23.05 kg/m²     Physical Exam  Vitals and nursing note reviewed.   Constitutional:       General: She is not in acute distress.     Appearance: Normal appearance.   HENT:      Right Ear: Tympanic membrane normal.      Left Ear: Tympanic membrane normal.      Nose: Congestion present.      Mouth/Throat:      Pharynx: No oropharyngeal exudate or posterior oropharyngeal erythema.   Eyes:      Conjunctiva/sclera: Conjunctivae normal.   Cardiovascular:      Rate and Rhythm: Normal rate and regular rhythm.      Heart sounds: Normal heart sounds.   Pulmonary:      Effort: Pulmonary effort is normal.      Breath sounds: Wheezing and rhonchi present. No rales.   Lymphadenopathy:      Cervical: No cervical " adenopathy.   Skin:     General: Skin is warm and dry.   Neurological:      Mental Status: She is alert.   Psychiatric:         Mood and Affect: Mood normal.         Behavior: Behavior normal.         Assessment/Plan   Problem List Items Addressed This Visit    None  Visit Diagnoses         Codes    Bronchitis    -  Primary J40    Relevant Medications    azithromycin (Zithromax) 250 mg tablet    predniSONE (Deltasone) 10 mg tablet    Acute cough     R05.1    Relevant Orders    XR chest 2 views    Wheezing     R06.2    Body mass index (BMI) of 23.0 to 23.9 in adult     Z68.23        Check Chest Xray.   Start zithromax and prednisone as directed.   Increase rest and fluids.   Follow up in 3-5 days with any persisting symptoms, or sooner with any additional concerns.   If developing any worsening symptoms, chest pains, trouble breathing, dizziness or confusion, proceed to the emergency department.

## 2025-01-27 ENCOUNTER — TELEPHONE (OUTPATIENT)
Dept: PRIMARY CARE | Facility: CLINIC | Age: 60
End: 2025-01-27
Payer: MEDICARE

## 2025-01-27 NOTE — TELEPHONE ENCOUNTER
Dr. Posada Pt    Pt has questions about prescription-    lisinopriL-hydrochlorothiazide 20-12.5 mg tablet  Signature says 1 a day, but previous refill stated that the signature was for 2 a day. Pt would like clarification so she's taking the proper dose. Please call pt back with correct dose. Thank you!    Karol  [phone]

## 2025-01-28 ENCOUNTER — TELEPHONE (OUTPATIENT)
Dept: PRIMARY CARE | Facility: CLINIC | Age: 60
End: 2025-01-28
Payer: MEDICARE

## 2025-01-28 DIAGNOSIS — J44.9 CHRONIC OBSTRUCTIVE PULMONARY DISEASE, UNSPECIFIED: ICD-10-CM

## 2025-01-28 DIAGNOSIS — I10 PRIMARY HYPERTENSION: ICD-10-CM

## 2025-01-28 RX ORDER — BUDESONIDE, GLYCOPYRROLATE, AND FORMOTEROL FUMARATE 160; 9; 4.8 UG/1; UG/1; UG/1
2 AEROSOL, METERED RESPIRATORY (INHALATION)
Qty: 10.7 G | Refills: 3 | Status: SHIPPED | OUTPATIENT
Start: 2025-01-28

## 2025-01-28 RX ORDER — LISINOPRIL AND HYDROCHLOROTHIAZIDE 12.5; 2 MG/1; MG/1
1 TABLET ORAL DAILY
Qty: 90 TABLET | Refills: 3 | Status: SHIPPED | OUTPATIENT
Start: 2025-01-28

## 2025-01-28 NOTE — TELEPHONE ENCOUNTER
Called and LVM for pt  Are we able to send the other half of the script over to    [facility] - [address]

## 2025-01-28 NOTE — TELEPHONE ENCOUNTER
Dr. Posada Pt    Pt called in to let GARRY and MA know that her inhaler should be for 90 days through the mail order, not for 30. Pharmacy is supposed to reach out about other half of     lisinopriL-hydrochlorothiazide 20-12.5 mg tablet - pt only received half of the script the last fill.     budesonide-glycopyr-formoterol (Breztri Aerosphere) 160-9-4.8 mcg/actuation HFA aerosol inhaler    [facility] [place] [place]  [address] [place] [address]

## 2025-02-13 ENCOUNTER — HOSPITAL ENCOUNTER (OUTPATIENT)
Dept: RADIOLOGY | Facility: CLINIC | Age: 60
Discharge: HOME | End: 2025-02-13
Payer: MEDICARE

## 2025-02-13 DIAGNOSIS — F17.210 NICOTINE DEPENDENCE, CIGARETTES, UNCOMPLICATED: ICD-10-CM

## 2025-02-13 PROCEDURE — 71271 CT THORAX LUNG CANCER SCR C-: CPT

## 2025-02-17 ENCOUNTER — TELEPHONE (OUTPATIENT)
Dept: PRIMARY CARE | Facility: CLINIC | Age: 60
End: 2025-02-17
Payer: MEDICARE

## 2025-02-17 DIAGNOSIS — I25.10 ATHEROSCLEROSIS OF CORONARY ARTERY OF NATIVE HEART, UNSPECIFIED VESSEL OR LESION TYPE, UNSPECIFIED WHETHER ANGINA PRESENT: Primary | ICD-10-CM

## 2025-03-07 DIAGNOSIS — B37.0 THRUSH, ORAL: ICD-10-CM

## 2025-03-07 RX ORDER — CLOTRIMAZOLE 10 MG/1
10 LOZENGE ORAL
Qty: 50 TROCHE | Refills: 0 | Status: SHIPPED | OUTPATIENT
Start: 2025-03-07 | End: 2025-03-17

## 2025-04-16 ENCOUNTER — APPOINTMENT (OUTPATIENT)
Dept: CARDIOLOGY | Facility: CLINIC | Age: 60
End: 2025-04-16
Payer: MEDICARE

## 2025-04-16 VITALS
SYSTOLIC BLOOD PRESSURE: 144 MMHG | WEIGHT: 124 LBS | HEIGHT: 61 IN | HEART RATE: 74 BPM | DIASTOLIC BLOOD PRESSURE: 86 MMHG | BODY MASS INDEX: 23.41 KG/M2

## 2025-04-16 DIAGNOSIS — E78.49 OTHER HYPERLIPIDEMIA: ICD-10-CM

## 2025-04-16 DIAGNOSIS — I10 PRIMARY HYPERTENSION: ICD-10-CM

## 2025-04-16 DIAGNOSIS — R00.2 PALPITATIONS: ICD-10-CM

## 2025-04-16 DIAGNOSIS — R07.89 CHEST DISCOMFORT: ICD-10-CM

## 2025-04-16 DIAGNOSIS — I73.9 INTERMITTENT CLAUDICATION (CMS-HCC): ICD-10-CM

## 2025-04-16 DIAGNOSIS — I25.10 ATHEROSCLEROSIS OF CORONARY ARTERY OF NATIVE HEART, UNSPECIFIED VESSEL OR LESION TYPE, UNSPECIFIED WHETHER ANGINA PRESENT: ICD-10-CM

## 2025-04-16 DIAGNOSIS — I25.10 CORONARY ARTERY DISEASE INVOLVING NATIVE HEART, UNSPECIFIED VESSEL OR LESION TYPE, UNSPECIFIED WHETHER ANGINA PRESENT: ICD-10-CM

## 2025-04-16 DIAGNOSIS — F17.200 NICOTINE DEPENDENCE, UNCOMPLICATED, UNSPECIFIED NICOTINE PRODUCT TYPE: Primary | ICD-10-CM

## 2025-04-16 PROCEDURE — 3079F DIAST BP 80-89 MM HG: CPT | Performed by: INTERNAL MEDICINE

## 2025-04-16 PROCEDURE — 99205 OFFICE O/P NEW HI 60 MIN: CPT | Performed by: INTERNAL MEDICINE

## 2025-04-16 PROCEDURE — 4004F PT TOBACCO SCREEN RCVD TLK: CPT | Performed by: INTERNAL MEDICINE

## 2025-04-16 PROCEDURE — G2211 COMPLEX E/M VISIT ADD ON: HCPCS | Performed by: INTERNAL MEDICINE

## 2025-04-16 PROCEDURE — 3008F BODY MASS INDEX DOCD: CPT | Performed by: INTERNAL MEDICINE

## 2025-04-16 PROCEDURE — 3077F SYST BP >= 140 MM HG: CPT | Performed by: INTERNAL MEDICINE

## 2025-04-16 PROCEDURE — 93000 ELECTROCARDIOGRAM COMPLETE: CPT | Performed by: INTERNAL MEDICINE

## 2025-04-16 NOTE — ASSESSMENT & PLAN NOTE
Above all else, I advised the patient to quit tobacco, or else risk certain future cardiovascular morbidity, and/or mortality.    Orders:    Referral to Tobacco Cessation Counseling; Future    Follow Up In Cardiology; Future

## 2025-04-16 NOTE — H&P (VIEW-ONLY)
"Referred by Dr. Posada for Please see below.     History Of Present Illness:    Karol Rodas is a 60 y.o. female presenting with coronary artery calcification, de facto CAD, multiple cardiac risk factors, chest discomfort, palpitations, and claudication.    This 60-year-old hypertensive, hyperlipidemic 40-pack-year smoker with a strong family history of premature CAD (mother sustained an MI in her 30s; brothers sustained MIs in their 30s) is a patient of Dr. Posada.  She saw Dr. Sanchez of the preventive cardiology team in May 2024 because of cardiac risk factors.  Her coronary artery calcium score in June 2024 was elevated at 332.  A 24-hour ambulatory blood pressure monitor was done in May 2024 disclosing an average blood pressure during the monitoring period of 134/85.  Based on risk factors, adjustments to her medications were made, and she was advised to follow-up with her primary care physician.    Recently, because of her history of tobacco abuse, she had a low-dose screening CT scan performed in February 2025.  Among the comments that were made, calcified atherosclerosis involving the coronary arteries was mention.  Additionally, there was evidence of emphysema, and mild aortic valve calcification.  She was advised to see me.    The patient reports that she has had episodic discomfort in her chest.  \"It feels like a little elephant sitting on my chest (pointing to the mid chest)\".  The symptoms occur a couple of times a month, lasting for less than a minute.  The chest discomfort is not exertional.  She experiences dyspnea along with the chest discomfort, with usual duration being a couple of minutes.  She experiences symptoms of palpitations that feel like a fast fluttering of her chest, lasting for less than two minutes.  These symptoms occur a couple of times a week, and are independent of the shortness of breath and chest discomfort.  According to a note by another cardiologist in May 2022, she " has had prior cardiac catheterization perhaps most recently in 2017 which disclosed no significant coronary artery stenosis.  In May 2022 she was placed in observation due to syncope, and the cardiologist who saw her determined that the episode was likely vasovagal in origin.  An echocardiogram done in May 2022 revealed an ejection fraction of 55 to 60% with no significant valvular or pericardial disease.    She reports symptoms of cramping in both calves when she walks.      Water consumption:  80 oz/day  Alcohol consumption: :  three times a week, she has four mixed drinks  Caffeine consumption:   2 coffees per day  Recreational drugs:  none  SEA-   never been checked;  has nonrestorative sleep, she snores.      PMH: COPD, S/P appendectomy, hysterectomy    Past Medical History:  She has a past medical history of Acute upper respiratory infection, unspecified (03/23/2020), Encounter for immunization, Encounter for other screening for malignant neoplasm of breast (02/12/2021), Essential (primary) hypertension (09/13/2022), Gastro-esophageal reflux disease without esophagitis (04/20/2021), Hyperlipidemia, unspecified (06/22/2022), Immunization not carried out because of patient refusal, and Personal history of other benign neoplasm (07/26/2021).    Past Surgical History:  She has a past surgical history that includes Other surgical history (10/01/2019).      Social History:  She reports that she has been smoking cigarettes. She has a 15 pack-year smoking history. She has never used smokeless tobacco. She reports current alcohol use. She reports that she does not use drugs.    Family History:  Family History[1]     Allergies:  Latex, Penicillins, and Meperidine    Outpatient Medications:  Current Outpatient Medications   Medication Instructions    aspirin 81 mg EC tablet 1 tablet, Daily    atenolol (TENORMIN) 50 mg, oral, Daily    budesonide-glycopyr-formoterol (Breztri Aerosphere) 160-9-4.8 mcg/actuation HFA aerosol  "inhaler 2 puffs, inhalation, 2 times daily RT    icosapent ethyL (VASCEPA) 2 g, oral, 2 times daily (morning and late afternoon)    lisinopriL-hydrochlorothiazide 20-12.5 mg tablet 1 tablet, oral, Daily    nystatin (MYCOSTATIN) 500,000 Units, oral, 4 times daily, Swish in mouth and swallow    pantoprazole (PROTONIX) 40 mg, oral, Daily before breakfast    rosuvastatin (CRESTOR) 20 mg, oral, Daily        Last Recorded Vitals:  Vitals:    04/16/25 1500   BP: 144/86   BP Location: Right arm   Patient Position: Sitting   Pulse: 74   Weight: 56.2 kg (124 lb)   Height: 1.549 m (5' 1\")       Physical Exam:  GENERAL:  pleasant 60 year-old  HEENT: No xanthelasma  NECK: Supple, no palpable adenopathy or thyromegaly  CHEST: Clear to auscultation, respiratory effort unlabored  CARDIAC: RRR, normal S1 and S2, no audible murmur, rub, gallop, carotids are brisk, PMI is not displaced  ABD: Active bowel sounds, nontender, no organomegaly, no evidence of ascites  EXT: No clubbing, cyanosis, edema, or tenderness  NEURO: Awake, alert, appropriate, speech is fluent         Last Labs:  CBC -  Lab Results   Component Value Date    WBC 6.6 03/27/2024    HGB 13.2 03/27/2024    HCT 38.8 03/27/2024    MCV 91 03/27/2024     03/27/2024       CMP -  Lab Results   Component Value Date    CALCIUM 9.3 03/27/2024    PROT 6.4 03/27/2024    ALBUMIN 4.1 03/27/2024    AST 16 03/27/2024    ALT 13 03/27/2024    ALKPHOS 71 03/27/2024    BILITOT 0.5 03/27/2024       LIPID PANEL -   Lab Results   Component Value Date    CHOL 173 03/27/2024    TRIG 329 (H) 03/27/2024    HDL 36.6 03/27/2024    CHHDL 4.7 03/27/2024    LDLF - 05/24/2022    VLDL 66 (H) 03/27/2024    NHDL 136 03/27/2024       RENAL FUNCTION PANEL -   Lab Results   Component Value Date    GLUCOSE 97 03/27/2024     03/27/2024    K 3.6 03/27/2024     03/27/2024    CO2 31 03/27/2024    ANIONGAP 11 03/27/2024    BUN 12 03/27/2024    CREATININE 0.76 03/27/2024    CALCIUM 9.3 03/27/2024 "    ALBUMIN 4.1 03/27/2024        Lab Results   Component Value Date    BNP 69 10/09/2021    HGBA1C 5.8 (A) 05/24/2022         Diagnostic review: I have personally reviewed the result(s) of the Echocardiogram .  Please see HPI for details.    Assessment/Plan   Assessment & Plan  Atherosclerosis of coronary artery of native heart, unspecified vessel or lesion type, unspecified whether angina present    Orders:    Referral to Cardiology    ECG 12 lead (Clinic Performed)    Nicotine dependence, uncomplicated, unspecified nicotine product type  Above all else, I advised the patient to quit tobacco, or else risk certain future cardiovascular morbidity, and/or mortality.    Orders:    Referral to Tobacco Cessation Counseling; Future    Follow Up In Cardiology; Future    Chest discomfort    Orders:    Stress Test; Future    Follow Up In Cardiology; Future    Palpitations    Orders:    Holter Or Event Cardiac Monitor; Future    Follow Up In Cardiology; Future    Intermittent claudication ([facility])    Orders:    Vascular US PVR With Exercise; Future    Follow Up In Cardiology; Future    Coronary artery disease involving native heart, unspecified vessel or lesion type, unspecified whether angina present    Orders:    Follow Up In Cardiology; Future    Primary hypertension  HTN: BP is not well controlled.   We discussed sodium restriction, lifestyle modification, and the DASH diet.  I advised the patient to log blood pressures daily, and to bring the data to the next appointment.  If home BPs are also high, will need to add, or adjust medications.    Risk factor modification: educational materials were provided to the patient.     Orders:    Follow Up In Cardiology; Future    Other hyperlipidemia  Risk factor modification: educational materials were provided to the patient.     Orders:    Comprehensive metabolic panel; Future    Lipid panel; Future    Follow Up In Cardiology; Future            Michael Byrne MD         [1]    Family History  Problem Relation Name Age of Onset    Hypertension Mother      Stroke Mother      Heart attack Mother      Cancer Mother      Heart disease Father      Heart failure Father      Cancer Father      Hypertension Sister      Hyperlipidemia Sister      Other (cardiac stents) Sister      Heart attack Sister      Hypertension Sister      Hyperlipidemia Sister      Hypertension Sister      Hyperlipidemia Sister      Hypertension Sister      Hyperlipidemia Sister      Hypertension Sister      Hyperlipidemia Sister      Other (cardiac stents) Sister      Other (CABG) Brother      Hypertension Brother      Hyperlipidemia Brother      Hypertension Brother      Hyperlipidemia Brother      Hyperlipidemia Brother      Hypertension Brother      Other (CABG) Brother      Other (PAD) Brother      Hypertension Brother      Hyperlipidemia Brother      Other (CABG) Brother      Hypertension Brother      Hyperlipidemia Brother      Stroke Brother      Other (CABG) Brother      Hypertension Brother      Hyperlipidemia Brother      Hypertension Brother      Hyperlipidemia Brother

## 2025-04-16 NOTE — ASSESSMENT & PLAN NOTE
HTN: BP is not well controlled.   We discussed sodium restriction, lifestyle modification, and the DASH diet.  I advised the patient to log blood pressures daily, and to bring the data to the next appointment.  If home BPs are also high, will need to add, or adjust medications.    Risk factor modification: educational materials were provided to the patient.     Orders:    Follow Up In Cardiology; Future

## 2025-04-16 NOTE — PATIENT INSTRUCTIONS
"It was my pleasure to meet you.  I look forward to being your cardiologist.  I am a huge believer in communicating with my patients.  Please contact me at any time, if anything is not clear to you regarding anything we have discussed, or if new questions occur to you.     You should increase your intake of fresh fruits and vegetables.  Try to consume 9-12 servings per day of such foods.  You should increase your intake of deep sea fish such as salmon and tuna.  Try to get two servings per week of fish, but if you are a pregnant woman, talk to your obstetrician before increasing your fish intake.  You should increase your intake of unprocessed nuts such as walnuts or almonds.  Increase your intake of plant-based protein.  You should avoid fried foods.  Don't consume sugary or starchy foods and sugary drinks.  Avoid saturated fats.  Try not to dine at restaurants more than once per month, and don't dine at fast food places.  Try to get 7-9 hours of sleep every night.  Try to get 150 minutes per week of moderate intensity exercise (after I have cleared you to start an exercise program).  Try to maintain the appropriate weight for your height based on body mass index (BMI). Maintain your cholesterol, blood sugar, and blood pressure in the recommended respective normal ranges.  There is a wealth of information on the American Heart Association's website regarding this.  Just Google \"Life's Essential 8\" for more information.   Ask me about any of these details  if you have questions.    As your cardiologist, I will be available to you at any time to answer any question you have concerning your heart health.  My staff, Erika can also answer any questions you may have.  Best of luck.       It is important for us to have an accurate list of the medications, supplements, and their doses.  It is also important for us to have an accurate list of your allergies.  Please bring this information to every appointment.  " This is a vital part of the quality of care you receive through all of your providers.

## 2025-04-16 NOTE — PROGRESS NOTES
"Referred by Dr. Posada for Please see below.     History Of Present Illness:    Karol Rodas is a 60 y.o. female presenting with coronary artery calcification, de facto CAD, multiple cardiac risk factors, chest discomfort, palpitations, and claudication.    This 60-year-old hypertensive, hyperlipidemic 40-pack-year smoker with a strong family history of premature CAD (mother sustained an MI in her 30s; brothers sustained MIs in their 30s) is a patient of Dr. Posada.  She saw Dr. Sanchez of the preventive cardiology team in May 2024 because of cardiac risk factors.  Her coronary artery calcium score in June 2024 was elevated at 332.  A 24-hour ambulatory blood pressure monitor was done in May 2024 disclosing an average blood pressure during the monitoring period of 134/85.  Based on risk factors, adjustments to her medications were made, and she was advised to follow-up with her primary care physician.    Recently, because of her history of tobacco abuse, she had a low-dose screening CT scan performed in February 2025.  Among the comments that were made, calcified atherosclerosis involving the coronary arteries was mention.  Additionally, there was evidence of emphysema, and mild aortic valve calcification.  She was advised to see me.    The patient reports that she has had episodic discomfort in her chest.  \"It feels like a little elephant sitting on my chest (pointing to the mid chest)\".  The symptoms occur a couple of times a month, lasting for less than a minute.  The chest discomfort is not exertional.  She experiences dyspnea along with the chest discomfort, with usual duration being a couple of minutes.  She experiences symptoms of palpitations that feel like a fast fluttering of her chest, lasting for less than two minutes.  These symptoms occur a couple of times a week, and are independent of the shortness of breath and chest discomfort.  According to a note by another cardiologist in May 2022, she " has had prior cardiac catheterization perhaps most recently in 2017 which disclosed no significant coronary artery stenosis.  In May 2022 she was placed in observation due to syncope, and the cardiologist who saw her determined that the episode was likely vasovagal in origin.  An echocardiogram done in May 2022 revealed an ejection fraction of 55 to 60% with no significant valvular or pericardial disease.    She reports symptoms of cramping in both calves when she walks.      Water consumption:  80 oz/day  Alcohol consumption: :  three times a week, she has four mixed drinks  Caffeine consumption:   2 coffees per day  Recreational drugs:  none  SEA-   never been checked;  has nonrestorative sleep, she snores.      PMH: COPD, S/P appendectomy, hysterectomy    Past Medical History:  She has a past medical history of Acute upper respiratory infection, unspecified (03/23/2020), Encounter for immunization, Encounter for other screening for malignant neoplasm of breast (02/12/2021), Essential (primary) hypertension (09/13/2022), Gastro-esophageal reflux disease without esophagitis (04/20/2021), Hyperlipidemia, unspecified (06/22/2022), Immunization not carried out because of patient refusal, and Personal history of other benign neoplasm (07/26/2021).    Past Surgical History:  She has a past surgical history that includes Other surgical history (10/01/2019).      Social History:  She reports that she has been smoking cigarettes. She has a 15 pack-year smoking history. She has never used smokeless tobacco. She reports current alcohol use. She reports that she does not use drugs.    Family History:  Family History[1]     Allergies:  Latex, Penicillins, and Meperidine    Outpatient Medications:  Current Outpatient Medications   Medication Instructions    aspirin 81 mg EC tablet 1 tablet, Daily    atenolol (TENORMIN) 50 mg, oral, Daily    budesonide-glycopyr-formoterol (Breztri Aerosphere) 160-9-4.8 mcg/actuation HFA aerosol  "inhaler 2 puffs, inhalation, 2 times daily RT    icosapent ethyL (VASCEPA) 2 g, oral, 2 times daily (morning and late afternoon)    lisinopriL-hydrochlorothiazide 20-12.5 mg tablet 1 tablet, oral, Daily    nystatin (MYCOSTATIN) 500,000 Units, oral, 4 times daily, Swish in mouth and swallow    pantoprazole (PROTONIX) 40 mg, oral, Daily before breakfast    rosuvastatin (CRESTOR) 20 mg, oral, Daily        Last Recorded Vitals:  Vitals:    04/16/25 1500   BP: 144/86   BP Location: Right arm   Patient Position: Sitting   Pulse: 74   Weight: 56.2 kg (124 lb)   Height: 1.549 m (5' 1\")       Physical Exam:  GENERAL:  pleasant 60 year-old  HEENT: No xanthelasma  NECK: Supple, no palpable adenopathy or thyromegaly  CHEST: Clear to auscultation, respiratory effort unlabored  CARDIAC: RRR, normal S1 and S2, no audible murmur, rub, gallop, carotids are brisk, PMI is not displaced  ABD: Active bowel sounds, nontender, no organomegaly, no evidence of ascites  EXT: No clubbing, cyanosis, edema, or tenderness  NEURO: Awake, alert, appropriate, speech is fluent         Last Labs:  CBC -  Lab Results   Component Value Date    WBC 6.6 03/27/2024    HGB 13.2 03/27/2024    HCT 38.8 03/27/2024    MCV 91 03/27/2024     03/27/2024       CMP -  Lab Results   Component Value Date    CALCIUM 9.3 03/27/2024    PROT 6.4 03/27/2024    ALBUMIN 4.1 03/27/2024    AST 16 03/27/2024    ALT 13 03/27/2024    ALKPHOS 71 03/27/2024    BILITOT 0.5 03/27/2024       LIPID PANEL -   Lab Results   Component Value Date    CHOL 173 03/27/2024    TRIG 329 (H) 03/27/2024    HDL 36.6 03/27/2024    CHHDL 4.7 03/27/2024    LDLF - 05/24/2022    VLDL 66 (H) 03/27/2024    NHDL 136 03/27/2024       RENAL FUNCTION PANEL -   Lab Results   Component Value Date    GLUCOSE 97 03/27/2024     03/27/2024    K 3.6 03/27/2024     03/27/2024    CO2 31 03/27/2024    ANIONGAP 11 03/27/2024    BUN 12 03/27/2024    CREATININE 0.76 03/27/2024    CALCIUM 9.3 03/27/2024 "    ALBUMIN 4.1 03/27/2024        Lab Results   Component Value Date    BNP 69 10/09/2021    HGBA1C 5.8 (A) 05/24/2022         Diagnostic review: I have personally reviewed the result(s) of the Echocardiogram .  Please see HPI for details.    Assessment/Plan   Assessment & Plan  Atherosclerosis of coronary artery of native heart, unspecified vessel or lesion type, unspecified whether angina present    Orders:    Referral to Cardiology    ECG 12 lead (Clinic Performed)    Nicotine dependence, uncomplicated, unspecified nicotine product type  Above all else, I advised the patient to quit tobacco, or else risk certain future cardiovascular morbidity, and/or mortality.    Orders:    Referral to Tobacco Cessation Counseling; Future    Follow Up In Cardiology; Future    Chest discomfort    Orders:    Stress Test; Future    Follow Up In Cardiology; Future    Palpitations    Orders:    Holter Or Event Cardiac Monitor; Future    Follow Up In Cardiology; Future    Intermittent claudication (CMS-[facility])    Orders:    Vascular US PVR With Exercise; Future    Follow Up In Cardiology; Future    Coronary artery disease involving native heart, unspecified vessel or lesion type, unspecified whether angina present    Orders:    Follow Up In Cardiology; Future    Primary hypertension  HTN: BP is not well controlled.   We discussed sodium restriction, lifestyle modification, and the DASH diet.  I advised the patient to log blood pressures daily, and to bring the data to the next appointment.  If home BPs are also high, will need to add, or adjust medications.    Risk factor modification: educational materials were provided to the patient.     Orders:    Follow Up In Cardiology; Future    Other hyperlipidemia  Risk factor modification: educational materials were provided to the patient.     Orders:    Comprehensive metabolic panel; Future    Lipid panel; Future    Follow Up In Cardiology; Future            Michael Byrne MD         [1]    Family History  Problem Relation Name Age of Onset    Hypertension Mother      Stroke Mother      Heart attack Mother      Cancer Mother      Heart disease Father      Heart failure Father      Cancer Father      Hypertension Sister      Hyperlipidemia Sister      Other (cardiac stents) Sister      Heart attack Sister      Hypertension Sister      Hyperlipidemia Sister      Hypertension Sister      Hyperlipidemia Sister      Hypertension Sister      Hyperlipidemia Sister      Hypertension Sister      Hyperlipidemia Sister      Other (cardiac stents) Sister      Other (CABG) Brother      Hypertension Brother      Hyperlipidemia Brother      Hypertension Brother      Hyperlipidemia Brother      Hyperlipidemia Brother      Hypertension Brother      Other (CABG) Brother      Other (PAD) Brother      Hypertension Brother      Hyperlipidemia Brother      Other (CABG) Brother      Hypertension Brother      Hyperlipidemia Brother      Stroke Brother      Other (CABG) Brother      Hypertension Brother      Hyperlipidemia Brother      Hypertension Brother      Hyperlipidemia Brother

## 2025-04-22 LAB
ALBUMIN SERPL-MCNC: 4.3 G/DL (ref 3.6–5.1)
ALP SERPL-CCNC: 66 U/L (ref 37–153)
ALT SERPL-CCNC: 13 U/L (ref 6–29)
ANION GAP SERPL CALCULATED.4IONS-SCNC: 11 MMOL/L (CALC) (ref 7–17)
AST SERPL-CCNC: 18 U/L (ref 10–35)
BILIRUB SERPL-MCNC: 0.4 MG/DL (ref 0.2–1.2)
BUN SERPL-MCNC: 11 MG/DL (ref 7–25)
CALCIUM SERPL-MCNC: 9.3 MG/DL (ref 8.6–10.4)
CHLORIDE SERPL-SCNC: 99 MMOL/L (ref 98–110)
CHOLEST SERPL-MCNC: 186 MG/DL
CHOLEST/HDLC SERPL: 4.7 (CALC)
CO2 SERPL-SCNC: 31 MMOL/L (ref 20–32)
CREAT SERPL-MCNC: 0.68 MG/DL (ref 0.5–1.05)
EGFRCR SERPLBLD CKD-EPI 2021: 100 ML/MIN/1.73M2
GLUCOSE SERPL-MCNC: 100 MG/DL (ref 65–99)
HDLC SERPL-MCNC: 40 MG/DL
LDLC SERPL CALC-MCNC: ABNORMAL MG/DL
NONHDLC SERPL-MCNC: 146 MG/DL (CALC)
POTASSIUM SERPL-SCNC: 3.5 MMOL/L (ref 3.5–5.3)
PROT SERPL-MCNC: 6.6 G/DL (ref 6.1–8.1)
SODIUM SERPL-SCNC: 141 MMOL/L (ref 135–146)
TRIGL SERPL-MCNC: 514 MG/DL

## 2025-04-25 ENCOUNTER — HOSPITAL ENCOUNTER (OUTPATIENT)
Dept: CARDIOLOGY | Facility: HOSPITAL | Age: 60
Discharge: HOME | End: 2025-04-25
Payer: MEDICARE

## 2025-04-25 DIAGNOSIS — R07.89 CHEST DISCOMFORT: ICD-10-CM

## 2025-04-25 DIAGNOSIS — R00.2 PALPITATIONS: ICD-10-CM

## 2025-04-25 PROCEDURE — 93242 EXT ECG>48HR<7D RECORDING: CPT

## 2025-04-25 PROCEDURE — 93017 CV STRESS TEST TRACING ONLY: CPT

## 2025-04-28 ENCOUNTER — HOSPITAL ENCOUNTER (OUTPATIENT)
Dept: CARDIOLOGY | Facility: HOSPITAL | Age: 60
Discharge: HOME | End: 2025-04-28
Payer: MEDICARE

## 2025-04-28 DIAGNOSIS — E78.1 HYPERTRIGLYCERIDEMIA: Primary | ICD-10-CM

## 2025-04-28 DIAGNOSIS — I73.9 INTERMITTENT CLAUDICATION (CMS-HCC): ICD-10-CM

## 2025-04-28 PROCEDURE — 93924 LWR XTR VASC STDY BILAT: CPT

## 2025-04-28 PROCEDURE — 93924 LWR XTR VASC STDY BILAT: CPT | Performed by: INTERNAL MEDICINE

## 2025-04-28 RX ORDER — ICOSAPENT ETHYL 1 G/1
2 CAPSULE ORAL
Qty: 360 CAPSULE | Refills: 3 | Status: SHIPPED | OUTPATIENT
Start: 2025-04-28 | End: 2025-05-02

## 2025-04-30 ENCOUNTER — TELEMEDICINE (OUTPATIENT)
Dept: CARDIOLOGY | Facility: CLINIC | Age: 60
End: 2025-04-30
Payer: MEDICARE

## 2025-04-30 DIAGNOSIS — E78.5 DYSLIPIDEMIA: ICD-10-CM

## 2025-04-30 DIAGNOSIS — I10 PRIMARY HYPERTENSION: ICD-10-CM

## 2025-04-30 DIAGNOSIS — Z72.0 TOBACCO ABUSE: ICD-10-CM

## 2025-04-30 DIAGNOSIS — R94.39 ABNORMAL STRESS TEST: ICD-10-CM

## 2025-04-30 DIAGNOSIS — I20.0 ACCELERATING ANGINA (MULTI): Primary | ICD-10-CM

## 2025-04-30 PROCEDURE — 4004F PT TOBACCO SCREEN RCVD TLK: CPT | Performed by: INTERNAL MEDICINE

## 2025-04-30 PROCEDURE — 99213 OFFICE O/P EST LOW 20 MIN: CPT | Performed by: INTERNAL MEDICINE

## 2025-04-30 PROCEDURE — G2211 COMPLEX E/M VISIT ADD ON: HCPCS | Performed by: INTERNAL MEDICINE

## 2025-04-30 NOTE — ASSESSMENT & PLAN NOTE
Orders:    Case Request Cath Lab: [facility], No LV

## 2025-04-30 NOTE — PATIENT INSTRUCTIONS

## 2025-04-30 NOTE — PROGRESS NOTES
Chief Complaint:   Please see below.   (video virtual visit).    Virtual or Telephone Consent    An interactive audio and video telecommunication system which permits real time communications between the patient (at the originating site) and provider (at the distant site) was utilized to provide this telehealth service.   Verbal consent was requested and obtained from Karol Rodas on this date, 04/30/25 for a telehealth visit and the patient's location was confirmed at the time of the visit.       History Of Present Illness:    Karol Rodas is a 60 y.o. female presenting with angina pectoris, abnormal stress test.    This 60-year-old hypertensive, hyperlipidemic 40-pack-year smoker with a strong family history of premature CAD has had symptoms of of exertional angina as described in my previous note dated April 16, 2025.  Please see that note for complete details..  She continues to experience exertional chest discomfort and pressure as described in the previous note.  Such symptoms are provoked with activities and last for minutes before resolving.  On occasion, she has experienced jaw discomfort along with the exertional chest discomfort.  The patient denies chest discomfort, dyspnea, palpitations, orthopnea, PND, syncope, and near syncope.    Of note, her stress test on April 16, 2025 was abnormal with 1 to 2 mm of ST depressions in the inferolateral leads at 7 METS of exercise.       Last Recorded Vitals:  There were no vitals filed for this visit.    Past Medical History:  She has a past medical history of Acute upper respiratory infection, unspecified (03/23/2020), Encounter for immunization, Encounter for other screening for malignant neoplasm of breast (02/12/2021), Essential (primary) hypertension (09/13/2022), Gastro-esophageal reflux disease without esophagitis (04/20/2021), Hyperlipidemia, unspecified (06/22/2022), Immunization not carried out because of patient refusal, and Personal  history of other benign neoplasm (07/26/2021).    Past Surgical History:  She has a past surgical history that includes Other surgical history (10/01/2019).      Social History:  She reports that she has been smoking cigarettes. She has a 15 pack-year smoking history. She has never used smokeless tobacco. She reports current alcohol use. She reports that she does not use drugs.    Family History:  Family History[1]     Allergies:  Latex, Penicillins, and Meperidine    Outpatient Medications:  Current Outpatient Medications   Medication Instructions    aspirin 81 mg EC tablet 1 tablet, Daily    atenolol (TENORMIN) 50 mg, oral, Daily    budesonide-glycopyr-formoterol (Breztri Aerosphere) 160-9-4.8 mcg/actuation HFA aerosol inhaler 2 puffs, inhalation, 2 times daily RT    icosapent ethyL (VASCEPA) 2 g, oral, 2 times daily (morning and late afternoon)    lisinopriL-hydrochlorothiazide 20-12.5 mg tablet 1 tablet, oral, Daily    nystatin (MYCOSTATIN) 500,000 Units, oral, 4 times daily, Swish in mouth and swallow    pantoprazole (PROTONIX) 40 mg, oral, Daily before breakfast    rosuvastatin (CRESTOR) 20 mg, oral, Daily       Physical Exam:  Since this was a virtual visit, no exam was performed.        Last Labs:  CBC -  Lab Results   Component Value Date    WBC 6.6 03/27/2024    HGB 13.2 03/27/2024    HCT 38.8 03/27/2024    MCV 91 03/27/2024     03/27/2024       CMP -  Lab Results   Component Value Date    CALCIUM 9.3 04/22/2025    PROT 6.6 04/22/2025    ALBUMIN 4.3 04/22/2025    AST 18 04/22/2025    ALT 13 04/22/2025    ALKPHOS 66 04/22/2025    BILITOT 0.4 04/22/2025       LIPID PANEL -   Lab Results   Component Value Date    CHOL 186 04/22/2025    TRIG 514 (H) 04/22/2025    HDL 40 (L) 04/22/2025    CHHDL 4.7 04/22/2025    LDLF - 05/24/2022    VLDL 66 (H) 03/27/2024    NHDL 146 (H) 04/22/2025       RENAL FUNCTION PANEL -   Lab Results   Component Value Date    GLUCOSE 100 (H) 04/22/2025     04/22/2025    K  3.5 04/22/2025    CL 99 04/22/2025    CO2 31 04/22/2025    ANIONGAP 11 04/22/2025    BUN 11 04/22/2025    CREATININE 0.68 04/22/2025    CALCIUM 9.3 04/22/2025    ALBUMIN 4.3 04/22/2025        Lab Results   Component Value Date    BNP 69 10/09/2021    HGBA1C 5.8 (A) 05/24/2022         Lab review: I have Chemistry CMP:   Lab Results   Component Value Date    ALBUMIN 4.3 04/22/2025    CALCIUM 9.3 04/22/2025    CO2 31 04/22/2025    CREATININE 0.68 04/22/2025    GLUCOSE 100 (H) 04/22/2025    BILITOT 0.4 04/22/2025    PROT 6.6 04/22/2025    ALT 13 04/22/2025    AST 18 04/22/2025    ALKPHOS 66 04/22/2025   , Chemistry BMP   Lab Results   Component Value Date    GLUCOSE 100 (H) 04/22/2025    CALCIUM 9.3 04/22/2025    CO2 31 04/22/2025    CREATININE 0.68 04/22/2025   , and Lipids:   Lab Results   Component Value Date    CHOL 186 04/22/2025    HDL 40 (L) 04/22/2025    LDLCALC  04/22/2025      Comment:         LDL cholesterol not calculated. Triglyceride levels  greater than 400 mg/dL invalidate calculated LDL results.           Reference range: <100     Desirable range <100 mg/dL for primary prevention;    <70 mg/dL for patients with CHD or diabetic patients   with > or = 2 CHD risk factors.     LDL-C is now calculated using the Joshua-Scooter   calculation, which is a validated novel method providing   better accuracy than the Friedewald equation in the   estimation of LDL-C.   Joshua HULL et al. LENIN. 2013;310(19): 3714-2075   (http://education.allyve.com/faq/UVA123)      TRIG 514 (H) 04/22/2025     Diagnostic review: I have independently interpreted the stress test.  My findings are as summarized in the HPI and report.  Her PVR with exercise dated April 28, 2025 was normal.    Assessment/Plan   Assessment & Plan  Accelerating angina (Multi)    Orders:    Case Request Cath Lab: [facility], No LV    Abnormal stress test    Orders:    Case Request Cath Lab: [facility], No LV    Primary hypertension         Dyslipidemia          Tobacco abuse         Based on her accelerating pattern of angina and objective test results with abnormal stress test as described, we discussed possible approaches to her care.  I advised her to avoid heavy physical exertion and to contact me with any further acceleration or persistence in symptoms.  I have referred her to Dr. Gonzalez for cardiac catheterization    Between interviewing the patient, reviewing test results, placing orders and compiling the note, I spent a total of 24 minutes in providing care.        Michael Byrne MD         [1]   Family History  Problem Relation Name Age of Onset    Hypertension Mother      Stroke Mother      Heart attack Mother      Cancer Mother      Heart disease Father      Heart failure Father      Cancer Father      Hypertension Sister      Hyperlipidemia Sister      Other (cardiac stents) Sister      Heart attack Sister      Hypertension Sister      Hyperlipidemia Sister      Hypertension Sister      Hyperlipidemia Sister      Hypertension Sister      Hyperlipidemia Sister      Hypertension Sister      Hyperlipidemia Sister      Other (cardiac stents) Sister      Other (CABG) Brother      Hypertension Brother      Hyperlipidemia Brother      Hypertension Brother      Hyperlipidemia Brother      Hyperlipidemia Brother      Hypertension Brother      Other (CABG) Brother      Other (PAD) Brother      Hypertension Brother      Hyperlipidemia Brother      Other (CABG) Brother      Hypertension Brother      Hyperlipidemia Brother      Stroke Brother      Other (CABG) Brother      Hypertension Brother      Hyperlipidemia Brother      Hypertension Brother      Hyperlipidemia Brother

## 2025-05-02 ENCOUNTER — TELEPHONE (OUTPATIENT)
Dept: CARDIOLOGY | Facility: CLINIC | Age: 60
End: 2025-05-02
Payer: MEDICARE

## 2025-05-02 ENCOUNTER — DOCUMENTATION (OUTPATIENT)
Dept: CARDIOLOGY | Facility: CLINIC | Age: 60
End: 2025-05-02
Payer: MEDICARE

## 2025-05-02 DIAGNOSIS — E78.1 HYPERTRIGLYCERIDEMIA: Primary | ICD-10-CM

## 2025-05-02 RX ORDER — OMEGA-3-ACID ETHYL ESTERS 1 G/1
2 CAPSULE, LIQUID FILLED ORAL 2 TIMES DAILY
Qty: 360 CAPSULE | Refills: 3 | Status: SHIPPED | OUTPATIENT
Start: 2025-05-02 | End: 2026-05-02

## 2025-05-02 NOTE — PROGRESS NOTES
Icosapent ethyl not approved by her insurance.  I have prescribed Omega 3 ethyl esters 2000 mg bid.

## 2025-05-02 NOTE — TELEPHONE ENCOUNTER
I submitted a Prior Auth for the ordered Icosapent Ethyl / Vascepa, it was denied.    Per [employer], for this medication to be covered / approved, patient must have tried and failed generic Lovaza and have Triglyceride level greater than or equal to 500 mg/dL.    I do not show a record of patient taking generic Lovaza.  Karol's Triglycerides are  514 ( labs dated 4/22/25 ).    Dr. Byrne, would it be ok to switch to generic Lovaza?      To Dr. Byrne for review.  ---ssd.

## 2025-05-06 ENCOUNTER — HOSPITAL ENCOUNTER (OUTPATIENT)
Facility: HOSPITAL | Age: 60
Setting detail: OUTPATIENT SURGERY
Discharge: HOME | End: 2025-05-06
Attending: INTERNAL MEDICINE | Admitting: INTERNAL MEDICINE
Payer: MEDICARE

## 2025-05-06 VITALS
TEMPERATURE: 96.8 F | DIASTOLIC BLOOD PRESSURE: 78 MMHG | HEART RATE: 63 BPM | WEIGHT: 124 LBS | RESPIRATION RATE: 16 BRPM | BODY MASS INDEX: 23.43 KG/M2 | OXYGEN SATURATION: 94 % | SYSTOLIC BLOOD PRESSURE: 123 MMHG

## 2025-05-06 DIAGNOSIS — I20.9 ANGINA PECTORIS, UNSPECIFIED: ICD-10-CM

## 2025-05-06 DIAGNOSIS — R94.39 ABNORMAL STRESS TEST: ICD-10-CM

## 2025-05-06 DIAGNOSIS — I20.0 ACCELERATING ANGINA (MULTI): ICD-10-CM

## 2025-05-06 LAB
ERYTHROCYTE [DISTWIDTH] IN BLOOD BY AUTOMATED COUNT: 11.9 % (ref 11.5–14.5)
HCT VFR BLD AUTO: 41 % (ref 36–46)
HGB BLD-MCNC: 13.9 G/DL (ref 12–16)
MCH RBC QN AUTO: 31.1 PG (ref 26–34)
MCHC RBC AUTO-ENTMCNC: 33.9 G/DL (ref 32–36)
MCV RBC AUTO: 92 FL (ref 80–100)
NRBC BLD-RTO: 0 /100 WBCS (ref 0–0)
PLATELET # BLD AUTO: 266 X10*3/UL (ref 150–450)
RBC # BLD AUTO: 4.47 X10*6/UL (ref 4–5.2)
WBC # BLD AUTO: 8.4 X10*3/UL (ref 4.4–11.3)

## 2025-05-06 PROCEDURE — 2500000004 HC RX 250 GENERAL PHARMACY W/ HCPCS (ALT 636 FOR OP/ED): Performed by: INTERNAL MEDICINE

## 2025-05-06 PROCEDURE — 93571 IV DOP VEL&/PRESS C FLO 1ST: CPT | Performed by: INTERNAL MEDICINE

## 2025-05-06 PROCEDURE — 36415 COLL VENOUS BLD VENIPUNCTURE: CPT | Performed by: INTERNAL MEDICINE

## 2025-05-06 PROCEDURE — 99153 MOD SED SAME PHYS/QHP EA: CPT | Performed by: INTERNAL MEDICINE

## 2025-05-06 PROCEDURE — 93571 IV DOP VEL&/PRESS C FLO 1ST: CPT | Mod: RC | Performed by: INTERNAL MEDICINE

## 2025-05-06 PROCEDURE — 7100000009 HC PHASE TWO TIME - INITIAL BASE CHARGE: Performed by: INTERNAL MEDICINE

## 2025-05-06 PROCEDURE — 85027 COMPLETE CBC AUTOMATED: CPT | Performed by: INTERNAL MEDICINE

## 2025-05-06 PROCEDURE — C1887 CATHETER, GUIDING: HCPCS | Performed by: INTERNAL MEDICINE

## 2025-05-06 PROCEDURE — C1760 CLOSURE DEV, VASC: HCPCS | Performed by: INTERNAL MEDICINE

## 2025-05-06 PROCEDURE — 93458 L HRT ARTERY/VENTRICLE ANGIO: CPT | Performed by: INTERNAL MEDICINE

## 2025-05-06 PROCEDURE — C1894 INTRO/SHEATH, NON-LASER: HCPCS | Performed by: INTERNAL MEDICINE

## 2025-05-06 PROCEDURE — 2550000001 HC RX 255 CONTRASTS: Performed by: INTERNAL MEDICINE

## 2025-05-06 PROCEDURE — 2720000007 HC OR 272 NO HCPCS: Performed by: INTERNAL MEDICINE

## 2025-05-06 PROCEDURE — 99152 MOD SED SAME PHYS/QHP 5/>YRS: CPT | Performed by: INTERNAL MEDICINE

## 2025-05-06 PROCEDURE — 7100000010 HC PHASE TWO TIME - EACH INCREMENTAL 1 MINUTE: Performed by: INTERNAL MEDICINE

## 2025-05-06 PROCEDURE — C1769 GUIDE WIRE: HCPCS | Performed by: INTERNAL MEDICINE

## 2025-05-06 RX ORDER — MIDAZOLAM HYDROCHLORIDE 1 MG/ML
INJECTION, SOLUTION INTRAMUSCULAR; INTRAVENOUS AS NEEDED
Status: DISCONTINUED | OUTPATIENT
Start: 2025-05-06 | End: 2025-05-06 | Stop reason: HOSPADM

## 2025-05-06 RX ORDER — LIDOCAINE HYDROCHLORIDE 10 MG/ML
INJECTION, SOLUTION INTRAVENOUS AS NEEDED
Status: DISCONTINUED | OUTPATIENT
Start: 2025-05-06 | End: 2025-05-06 | Stop reason: HOSPADM

## 2025-05-06 RX ORDER — NITROGLYCERIN 5 MG/ML
INJECTION, SOLUTION INTRAVENOUS AS NEEDED
Status: DISCONTINUED | OUTPATIENT
Start: 2025-05-06 | End: 2025-05-06 | Stop reason: HOSPADM

## 2025-05-06 RX ORDER — FENTANYL CITRATE 50 UG/ML
INJECTION, SOLUTION INTRAMUSCULAR; INTRAVENOUS AS NEEDED
Status: DISCONTINUED | OUTPATIENT
Start: 2025-05-06 | End: 2025-05-06 | Stop reason: HOSPADM

## 2025-05-06 RX ORDER — NICARDIPINE HYDROCHLORIDE 2.5 MG/ML
INJECTION INTRAVENOUS AS NEEDED
Status: DISCONTINUED | OUTPATIENT
Start: 2025-05-06 | End: 2025-05-06 | Stop reason: HOSPADM

## 2025-05-06 RX ORDER — HEPARIN SODIUM 1000 [USP'U]/ML
INJECTION, SOLUTION INTRAVENOUS; SUBCUTANEOUS AS NEEDED
Status: DISCONTINUED | OUTPATIENT
Start: 2025-05-06 | End: 2025-05-06 | Stop reason: HOSPADM

## 2025-05-06 ASSESSMENT — PAIN SCALES - GENERAL

## 2025-05-06 ASSESSMENT — PAIN - FUNCTIONAL ASSESSMENT
PAIN_FUNCTIONAL_ASSESSMENT: 0-10

## 2025-05-06 NOTE — POST-PROCEDURE NOTE
Physician Transition of Care Summary  Invasive Cardiovascular Lab    Procedure Date: 5/6/2025  Attending:    * Bhavesh Gonzalez - Primary  Resident/Fellow/Other Assistant: Surgeons and Role:  * No surgeons found with a matching role *    Indications:   Pre-op Diagnosis      * Accelerating angina (Multi) [I20.0]     * Abnormal stress test [R94.39]    Post-procedure diagnosis:   Post-op Diagnosis     * Accelerating angina (Multi) [I20.0]     * Abnormal stress test [R94.39]    Procedure(s):   LHC, No LV  41631 - [place] CATH PLACEMENT & NJX CORONARY ART ANGIO IMG S&I    IFR (Instant Wave Free Ration)  28614 - [place] CATH PLACEMENT & NJX CORONARY ART ANGIO IMG S&I        Procedure Findings:   LMT normal.  LAD mild irregularity.  Lcx mild irregularity.  RCA small, 60-70% prox.  Normal LVEDP.  Normal LV.      IFR RCA =0.93 (negative)    Description of the Procedure:   R radial 5/6 husam riley, pig, IFR wire    Complications:   None    Stents/Implants:   Implants       No implant documentation for this case.            Anticoagulation/Antiplatelet Plan:   ASA    Estimated Blood Loss:   * No values recorded between 5/6/2025 12:07 PM and 5/6/2025 12:52 PM *    Anesthesia: Moderate Sedation Anesthesia Staff: No anesthesia staff entered.    Any Specimen(s) Removed:   Order Name Source Comment Collection Info Order Time   CBC Blood, Venous  Collected By: Ju Barnett RN 5/6/2025 10:37 AM     Release result to [facility]   Immediate            Disposition:   Medical therapy      Electronically signed by: Bhavesh Gonzalez MD, 5/6/2025 12:52 PM

## 2025-05-06 NOTE — Clinical Note
Closure device placed in the right radial artery. Site closed by radial compression system. Deployed By: Sylvie [id number]

## 2025-05-06 NOTE — DISCHARGE INSTRUCTIONS
CARDIAC CATHETERIZATION DISCHARGE INSTRUCTIONS (procedure done on ***)     FOR SUDDEN AND SEVERE CHEST PAIN, SHORTNESS OF BREATH, EXCESSIVE BLEEDING, SIGNS OF STROKE, OR CHANGES IN MENTAL STATUS YOU SHOULD CALL 911 IMMEDIATELY.     If your provider has prescribed aspirin and/or clopidogrel (Plavix), or prasugrel (Effient), or ticagrelor (Brilinta), DO NOT STOP THESE MEDICATIONS for any reason without talking to your cardiologist first. If any of these were prescribed, you must take them every day without missing a single dose. If you are getting low on these medications, contact your provider immediately for a refill.     FOR NEXT 24 HOURS  - Upon discharge, you should return home and rest for the remainder of the day and evening. You do not have to stay on bed rest but should not be very active.  It is recommended a responsible adult be with you for the first 24 hours after the procedure.    - No driving for 24 hours after procedure. Please arrange for someone to drive you home from the hospital today.     - Do not drive, operate machinery, or use power tools for 24 hours after your procedure.     - Do not make any legal decisions for 24 hours after your procedure.     - Do not drink alcoholic beverages for 24 hours after your procedure.    WOUND CARE   *FOR FEMORAL (LEG) ACCESS*  ·      Avoid heavy lifting (over 10 pounds) for 7 days, squatting or excessive bending for 2 days, and strenuous exercise for 7 days.  ·      No submerged bathing, swimming, or hot tubs for the next 7 days, or until fully healed.  ·      Avoid sexual activity for 3-4 days until any groin discomfort has ceased.     *FOR RADIAL (WRIST) ACCESS*  ·      No lifting more than 5 pounds or excessive use of the wrist for 24 hours - for example, treat your wrist as if it is sprained.  ·      Do not engage in vigorous activities (tennis, golf, bowling, weights) for at least 48 hours after the procedure.  ·      Do not submerge the wrist for  7 days after the procedure.  ·      You should expect mild tingling in your hand and tenderness at the puncture site for up to 3 days.    - The transparent dressing should be removed from the site 24 hours after the procedure.  Wash the site gently with soap and water. Rinse well and pat dry. Keep the area clean and dry. You may apply a Band-Aid to the site. Avoid lotions, ointments, or powders until fully healed.     - You may shower the day after your procedure.      - It is normal to notice a small bruise around the puncture site and/or a small grape sized or smaller lump. Any large bruising or large lump warrants a call to the office.     - If bleeding should occur, lay down and apply pressure to the affected area for 10 minutes.  If the bleeding stops notify your physician.  If there is a large amount of bleeding or spurting of blood CALL 911 immediately.  DO NOT drive yourself to the hospital.    - You may experience some tenderness, bruising or minimal inflammation.  If you have any concerns, you may contact the Cath Lab or if any of these symptoms become excessive, contact your cardiologist or go to the emergency room.     OTHER INSTRUCTIONS  - You may take acetaminophen (Tylenol) as directed for discomfort.  If pain is not relieved with acetaminophen (Tylenol), contact your doctor.    - If you notice or experience any of the following, you should notify your doctor or seek medical attention  Chest pain or discomfort  Change in mental status or weakness in extremities.  Dizziness, light headedness, or feeling faint.  Change in the site where the procedure was performed, such as bleeding or an increased area of bruising or swelling.  Tingling, numbness, pain, or coolness in the leg/arm beyond the site where the procedure was performed.  Signs of infection (i.e. shaking chills, temperature > 100 degrees Fahrenheit, warmth, redness) in the leg/arm area where the procedure was performed.  Changes in urination    Bloody or black stools  Vomiting blood  Severe nose bleeds  Any excessive bleeding    - If you DO NOT have an appointment with your cardiologist within 2-4 weeks following your procedure, please contact their office.

## 2025-05-27 ENCOUNTER — DOCUMENTATION (OUTPATIENT)
Dept: CARDIOLOGY | Facility: CLINIC | Age: 60
End: 2025-05-27
Payer: MEDICARE

## 2025-05-27 ENCOUNTER — TELEPHONE (OUTPATIENT)
Dept: CARDIOLOGY | Facility: CLINIC | Age: 60
End: 2025-05-27
Payer: MEDICARE

## 2025-05-27 NOTE — TELEPHONE ENCOUNTER
Karol called to report she is unable to swallow the Lovaza. They are to big. She reports having difficulties with all pills but these are not good. She spoke to the pharmacist to see if there was anything in a tablet/smaller form. She stated they told her maybe Tricor. I advised I would report your concerns & relay any advise/recommendations

## 2025-05-27 NOTE — PROGRESS NOTES
The patient contacted us, reporting she is unable to swallow Lovaza.  I messaged her via [employer] to stop Lovaza.  We will discuss at her upcoming visit.

## 2025-06-13 DIAGNOSIS — I10 PRIMARY HYPERTENSION: ICD-10-CM

## 2025-06-13 RX ORDER — LISINOPRIL AND HYDROCHLOROTHIAZIDE 12.5; 2 MG/1; MG/1
1 TABLET ORAL DAILY
Qty: 90 TABLET | Refills: 3 | Status: SHIPPED | OUTPATIENT
Start: 2025-06-13

## 2025-06-13 NOTE — TELEPHONE ENCOUNTER
[facility]     PT CALLED IN FOR MED REFILL    LISINOPRIL- PT TAKES TWO A DAY    [facility] [address]

## 2025-06-19 ENCOUNTER — TELEPHONE (OUTPATIENT)
Dept: PRIMARY CARE | Facility: CLINIC | Age: 60
End: 2025-06-19

## 2025-06-19 NOTE — TELEPHONE ENCOUNTER
Pt needs this canceled at the hospital   Already picked up     lisinopriL-hydrochlorothiazide 20-12.5 mg tablet

## 2025-06-29 NOTE — PROGRESS NOTES
"Chief Complaint:   Please see below.     History Of Present Illness:    Karol Rodas is a 60 y.o. female presenting with angina pectoris.    This 60-year-old hypertensive, prediabetic, hypertriglyceridemic 40-pack-year smoker returns to the office in follow up of exertional angina as described in previous notes.  The patient's stress test on 4/16/2025 was abnormal with 1 to 2 mm of ST depressions in the inferolateral leads at 7 METS of exercise. .  The patient's cath on 5/6/2025 disclosed no significant coronary stenosis.   The patient's monitor study done 4/25-5/2/2025 disclosed rare PACs, a single 5 beat run of atrial tachycardia.  Her exercise PVR in April 2025 revealed no significant lower extremity vascular disease.    Overall, she has had no progression in her prior symptoms.  She still gets tired and short of breath with minimal activities.  She has not had palpitations, orthopnea, PND, syncope, and near syncope.    The patient is still smoking despite my warnings.    Her insurance did not approve icosapent ethyl, and she  was unable to swallow mars 3 ethyl esters.           Last Recorded Vitals:  Vitals:    06/30/25 1300   BP: 132/74   BP Location: Right arm   Patient Position: Sitting   Pulse: 60   Weight: 54.9 kg (121 lb)   Height: (!) 1.549 m (5' 1\")       Past Medical History:  She has a past medical history of Acute upper respiratory infection, unspecified (03/23/2020), Encounter for immunization, Encounter for other screening for malignant neoplasm of breast (02/12/2021), Essential (primary) hypertension (09/13/2022), Gastro-esophageal reflux disease without esophagitis (04/20/2021), Hyperlipidemia, unspecified (06/22/2022), Immunization not carried out because of patient refusal, and Personal history of other benign neoplasm (07/26/2021).    Past Surgical History:  She has a past surgical history that includes Other surgical history (10/01/2019); Cardiac catheterization (N/A, 5/6/2025); and " Cardiac catheterization (5/6/2025).      Social History:  She reports that she has been smoking cigarettes. She has a 15 pack-year smoking history. She has never used smokeless tobacco. She reports current alcohol use. She reports that she does not use drugs.    Family History:  Family History[1]     Allergies:  Latex, Penicillins, and Meperidine    Outpatient Medications:  Current Outpatient Medications   Medication Instructions    aspirin 81 mg EC tablet 1 tablet, Daily    atenolol (TENORMIN) 50 mg, oral, Daily    budesonide-glycopyr-formoterol (Breztri Aerosphere) 160-9-4.8 mcg/actuation HFA aerosol inhaler 2 puffs, inhalation, 2 times daily RT    lisinopriL-hydrochlorothiazide 20-12.5 mg tablet 2 tablets, oral, Daily    nystatin (MYCOSTATIN) 500,000 Units, oral, 4 times daily, Swish in mouth and swallow    pantoprazole (PROTONIX) 40 mg, oral, Daily before breakfast    rosuvastatin (CRESTOR) 20 mg, oral, Daily       Physical Exam:  GENERAL:  pleasant 60 year-old  HEENT: No xanthelasma  NECK: Supple, no palpable adenopathy or thyromegaly  CHEST: Clear to auscultation, respiratory effort unlabored  CARDIAC: RRR, normal S1 and S2, no audible murmur, rub, gallop, carotids are brisk, PMI is not displaced  ABD: Active bowel sounds, nontender, no organomegaly, no evidence of ascites  EXT: No clubbing, cyanosis, edema, or tenderness  NEURO: Awake, alert, appropriate, speech is fluent       Last Labs:  CBC -  Lab Results   Component Value Date    WBC 8.4 05/06/2025    HGB 13.9 05/06/2025    HCT 41.0 05/06/2025    MCV 92 05/06/2025     05/06/2025       CMP -  Lab Results   Component Value Date    CALCIUM 9.3 04/22/2025    PROT 6.6 04/22/2025    ALBUMIN 4.3 04/22/2025    AST 18 04/22/2025    ALT 13 04/22/2025    ALKPHOS 66 04/22/2025    BILITOT 0.4 04/22/2025       LIPID PANEL -   Lab Results   Component Value Date    CHOL 186 04/22/2025    TRIG 514 (H) 04/22/2025    HDL 40 (L) 04/22/2025    CHHDL 4.7 04/22/2025     LDLF - 05/24/2022    VLDL 66 (H) 03/27/2024    NHDL 146 (H) 04/22/2025       RENAL FUNCTION PANEL -   Lab Results   Component Value Date    GLUCOSE 100 (H) 04/22/2025     04/22/2025    K 3.5 04/22/2025    CL 99 04/22/2025    CO2 31 04/22/2025    ANIONGAP 11 04/22/2025    BUN 11 04/22/2025    CREATININE 0.68 04/22/2025    CALCIUM 9.3 04/22/2025    ALBUMIN 4.3 04/22/2025        Lab Results   Component Value Date    BNP 69 10/09/2021    HGBA1C 5.8 (A) 05/24/2022         Diagnostic review: I have independently interpreted the cath .  My findings are  as summarized in the HPI.  .    Assessment/Plan   Assessment & Plan  Nicotine dependence, uncomplicated, unspecified nicotine product type  Above all else, I advised the patient to quit tobacco, or else risk certain future cardiovascular morbidity, and/or mortality.    Orders:    Follow Up In Cardiology    Follow Up In Cardiology; Future    Follow Up In Cardiology; Future    Chest discomfort    Orders:    Follow Up In Cardiology    Coronary artery disease involving native heart, unspecified vessel or lesion type, unspecified whether angina present    Orders:    Follow Up In Cardiology    Primary hypertension  HTN:  BP is well controlled.   We discussed sodium restriction, lifestyle modification, and the DASH diet.  I advised the patient to check BPs at home daily, and to contact me with an update in one month.    We provided the patient educational materials through [employer].    Orders:    Follow Up In Cardiology    Follow Up In Cardiology; Future    Follow Up In Cardiology; Future    Other hyperlipidemia    Orders:    Follow Up In Cardiology    Follow Up In Cardiology; Future    Follow Up In Cardiology; Future          Michael Byrne MD         [1]   Family History  Problem Relation Name Age of Onset    Hypertension Mother      Stroke Mother      Heart attack Mother      Cancer Mother      Heart disease Father      Heart failure Father      Cancer Father       Hypertension Sister      Hyperlipidemia Sister      Other (cardiac stents) Sister      Heart attack Sister      Hypertension Sister      Hyperlipidemia Sister      Hypertension Sister      Hyperlipidemia Sister      Hypertension Sister      Hyperlipidemia Sister      Hypertension Sister      Hyperlipidemia Sister      Other (cardiac stents) Sister      Other (CABG) Brother      Hypertension Brother      Hyperlipidemia Brother      Hypertension Brother      Hyperlipidemia Brother      Hyperlipidemia Brother      Hypertension Brother      Other (CABG) Brother      Other (PAD) Brother      Hypertension Brother      Hyperlipidemia Brother      Other (CABG) Brother      Hypertension Brother      Hyperlipidemia Brother      Stroke Brother      Other (CABG) Brother      Hypertension Brother      Hyperlipidemia Brother      Hypertension Brother      Hyperlipidemia Brother

## 2025-06-30 ENCOUNTER — APPOINTMENT (OUTPATIENT)
Dept: CARDIOLOGY | Facility: CLINIC | Age: 60
End: 2025-06-30
Payer: MEDICARE

## 2025-06-30 VITALS
BODY MASS INDEX: 22.84 KG/M2 | DIASTOLIC BLOOD PRESSURE: 74 MMHG | WEIGHT: 121 LBS | SYSTOLIC BLOOD PRESSURE: 132 MMHG | HEIGHT: 61 IN | HEART RATE: 60 BPM

## 2025-06-30 DIAGNOSIS — R00.2 PALPITATIONS: ICD-10-CM

## 2025-06-30 DIAGNOSIS — E78.49 OTHER HYPERLIPIDEMIA: ICD-10-CM

## 2025-06-30 DIAGNOSIS — I73.9 INTERMITTENT CLAUDICATION: ICD-10-CM

## 2025-06-30 DIAGNOSIS — I25.10 CORONARY ARTERY DISEASE INVOLVING NATIVE HEART, UNSPECIFIED VESSEL OR LESION TYPE, UNSPECIFIED WHETHER ANGINA PRESENT: ICD-10-CM

## 2025-06-30 DIAGNOSIS — I10 PRIMARY HYPERTENSION: ICD-10-CM

## 2025-06-30 DIAGNOSIS — F17.200 NICOTINE DEPENDENCE, UNCOMPLICATED, UNSPECIFIED NICOTINE PRODUCT TYPE: ICD-10-CM

## 2025-06-30 DIAGNOSIS — R07.89 CHEST DISCOMFORT: ICD-10-CM

## 2025-06-30 PROCEDURE — G2211 COMPLEX E/M VISIT ADD ON: HCPCS | Performed by: INTERNAL MEDICINE

## 2025-06-30 PROCEDURE — 4004F PT TOBACCO SCREEN RCVD TLK: CPT | Performed by: INTERNAL MEDICINE

## 2025-06-30 PROCEDURE — 3078F DIAST BP <80 MM HG: CPT | Performed by: INTERNAL MEDICINE

## 2025-06-30 PROCEDURE — 3008F BODY MASS INDEX DOCD: CPT | Performed by: INTERNAL MEDICINE

## 2025-06-30 PROCEDURE — 3075F SYST BP GE 130 - 139MM HG: CPT | Performed by: INTERNAL MEDICINE

## 2025-06-30 PROCEDURE — 99214 OFFICE O/P EST MOD 30 MIN: CPT | Performed by: INTERNAL MEDICINE

## 2025-06-30 NOTE — ASSESSMENT & PLAN NOTE
HTN:  BP is well controlled.   We discussed sodium restriction, lifestyle modification, and the DASH diet.  I advised the patient to check BPs at home daily, and to contact me with an update in one month.    We provided the patient educational materials through [employer].    Orders:    Follow Up In Cardiology    Follow Up In Cardiology; Future    Follow Up In Cardiology; Future

## 2025-06-30 NOTE — PATIENT INSTRUCTIONS
"You need to stop smoking. As you know, smoking increases the risk of future heart attacks, strokes, cancer, and emphysema. In addition to these problems, someone who smokes a pack a day spends $2000 per year on tobacco alone. Those costs add up, so that someone who has smoked a pack a day for twenty years has spent $40,000 out of pocket on tobacco in their lifetime. To help you quit smoking, you should call the [place] Quit Line at 4-304-QUIT-NOW. They will have other tips to help you quit. Also, there are good medicines that can help you quit.  [facility] has a tobacco clinic that can guide you through the process.  Just let me know if you'd like a referral.     You should increase your intake of fresh fruits and vegetables.  Try to consume 9-12 servings per day of such foods.  You should increase your intake of deep sea fish such as salmon and tuna.  Try to get two servings per week of fish, but if you are a pregnant woman, talk to your obstetrician before increasing your fish intake.  You should increase your intake of unprocessed nuts such as walnuts or almonds.  Increase your intake of plant-based protein.  You should avoid fried foods.  Don't consume sugary or starchy foods and sugary drinks.  Avoid saturated fats.  Try not to dine at restaurants more than once per month, and don't dine at fast food places.  Try to get 7-9 hours of sleep every night.  Try to get 150 minutes per week of moderate intensity exercise (after I have cleared you to start an exercise program).  Try to maintain the appropriate weight for your height based on body mass index (BMI). Maintain your cholesterol, blood sugar, and blood pressure in the recommended respective normal ranges.  There is a wealth of information on the American Heart Association's website regarding this.  Just Google \"Life's Essential 8\" for more information.   Ask me about any of these details  if you have questions.    As your cardiologist, I will be " available to you at any time to answer any question you have concerning your heart health.  My staff, Marah and Beba can also answer any questions you may have.  Best of luck.       It is important for us to have an accurate list of the medications, supplements, and their doses.  It is also important for us to have an accurate list of your allergies.  Please bring this information to every appointment.  This is a vital part of the quality of care you receive through all of your providers.

## 2025-06-30 NOTE — ASSESSMENT & PLAN NOTE
Above all else, I advised the patient to quit tobacco, or else risk certain future cardiovascular morbidity, and/or mortality.    Orders:    Follow Up In Cardiology    Follow Up In Cardiology; Future    Follow Up In Cardiology; Future

## 2025-07-03 DIAGNOSIS — J44.9 CHRONIC OBSTRUCTIVE PULMONARY DISEASE, UNSPECIFIED: ICD-10-CM

## 2025-07-03 RX ORDER — BUDESONIDE, GLYCOPYRROLATE, AND FORMOTEROL FUMARATE 160; 9; 4.8 UG/1; UG/1; UG/1
AEROSOL, METERED RESPIRATORY (INHALATION) 2 TIMES DAILY
Qty: 10.7 G | Refills: 11 | Status: SHIPPED | OUTPATIENT
Start: 2025-07-03

## 2025-07-14 DIAGNOSIS — I10 PRIMARY HYPERTENSION: ICD-10-CM

## 2025-07-14 NOTE — TELEPHONE ENCOUNTER
Pt needs refill on  lisinopriL-hydrochlorothiazide 20-12.5 mg tablet   90 day supply  Twice daily  [facility] - [place], [place]  [address]

## 2025-07-15 RX ORDER — LISINOPRIL AND HYDROCHLOROTHIAZIDE 12.5; 2 MG/1; MG/1
2 TABLET ORAL DAILY
Qty: 180 TABLET | Refills: 1 | Status: SHIPPED | OUTPATIENT
Start: 2025-07-15

## 2025-08-12 DIAGNOSIS — I10 PRIMARY HYPERTENSION: ICD-10-CM

## 2025-08-12 DIAGNOSIS — E78.5 DYSLIPIDEMIA: ICD-10-CM

## 2025-08-13 RX ORDER — ATENOLOL 50 MG/1
50 TABLET ORAL DAILY
Qty: 30 TABLET | Refills: 0 | Status: SHIPPED | OUTPATIENT
Start: 2025-08-13 | End: 2025-09-12

## 2025-08-13 RX ORDER — ROSUVASTATIN CALCIUM 20 MG/1
20 TABLET, COATED ORAL DAILY
Qty: 30 TABLET | Refills: 0 | Status: SHIPPED | OUTPATIENT
Start: 2025-08-13 | End: 2025-09-12

## 2025-08-21 DIAGNOSIS — K21.9 GASTROESOPHAGEAL REFLUX DISEASE, UNSPECIFIED WHETHER ESOPHAGITIS PRESENT: ICD-10-CM

## 2025-08-23 RX ORDER — PANTOPRAZOLE SODIUM 40 MG/1
40 TABLET, DELAYED RELEASE ORAL
Qty: 90 TABLET | Refills: 3 | Status: SHIPPED | OUTPATIENT
Start: 2025-08-23

## 2026-07-06 ENCOUNTER — APPOINTMENT (OUTPATIENT)
Dept: CARDIOLOGY | Facility: CLINIC | Age: 61
End: 2026-07-06
Payer: MEDICARE

## (undated) DEVICE — ANGIOPLASTY PACK, HEMOSTASIS VALVE PHD, SMALL BORE

## (undated) DEVICE — CATHETER, OPTITORQUE, 5FR, JACKY, 3.5/ 2H/110CM, CURVED

## (undated) DEVICE — MBRACE, WRIST SUPPORT WITH HOOK

## (undated) DEVICE — CURITY NON-ADHERENT STRIPS: Brand: CURITY

## (undated) DEVICE — TUBING, PRESSURE MONITOR, 24IN/61CM, FIXED FEMALE LUER

## (undated) DEVICE — CATHETER, DIAGNOSTIC, 5FR,  PIG-145, 110CM, 6SH ANGLED

## (undated) DEVICE — CATHETER, VISTA BRIGHT TIP, 6F, .070, 3 DRC, 100CM

## (undated) DEVICE — GLOVE ORANGE PI 7 1/2   MSG9075

## (undated) DEVICE — SPONGE GZ W4XL4IN COT 12 PLY TYP VII WVN C FLD DSGN

## (undated) DEVICE — MANIFOLD KIT, CUSTOM (SJM)

## (undated) DEVICE — ANGIOPLASTY PACK, ACCESS PLUS, W/ WIRE AND TORGUE

## (undated) DEVICE — SHEATH, GLIDESHEATH, SLENDER, 6FR 10CM

## (undated) DEVICE — PADDING CAST N ADH 4 YDX3 IN HIGHLY ABSORBENT EZ APPL SOFROL

## (undated) DEVICE — GUIDEWIRE, PRESSURE WIRE X , 175CM WIRELESS, AGILE TIP

## (undated) DEVICE — BANDAGE COBAN 2 IN COMPR FOAM 2INX5YD COFLX LF2

## (undated) DEVICE — CUP, MEDICINE, CLEAR, 2 OZ, STERILE

## (undated) DEVICE — GOWN,AURORA,NONREINFORCED,LARGE: Brand: MEDLINE

## (undated) DEVICE — LABEL MED MINI W/ MARKER

## (undated) DEVICE — HAND II: Brand: MEDLINE INDUSTRIES, INC.

## (undated) DEVICE — DRAPE C ARM W41XL125IN UNIV W CLP AND BND FOR FULL SZ C ARM

## (undated) DEVICE — TR BAND, RADIAL COMPRESSION, STANDARD, 24CM

## (undated) DEVICE — Device

## (undated) DEVICE — BANDAGE GZ W2XL75IN ST RAYON POLY CNFRM STRTCH LTWT

## (undated) DEVICE — CHLORAPREP 26ML ORANGE

## (undated) DEVICE — PADDING UNDERCAST W4INXL12FT RAYON POLY SYN NONADHESIVE